# Patient Record
Sex: FEMALE | Race: WHITE | NOT HISPANIC OR LATINO | Employment: PART TIME | URBAN - METROPOLITAN AREA
[De-identification: names, ages, dates, MRNs, and addresses within clinical notes are randomized per-mention and may not be internally consistent; named-entity substitution may affect disease eponyms.]

---

## 2017-04-04 ENCOUNTER — TRANSCRIBE ORDERS (OUTPATIENT)
Dept: ADMINISTRATIVE | Facility: HOSPITAL | Age: 67
End: 2017-04-04

## 2017-04-04 DIAGNOSIS — Z12.31 VISIT FOR SCREENING MAMMOGRAM: Primary | ICD-10-CM

## 2017-04-07 ENCOUNTER — HOSPITAL ENCOUNTER (OUTPATIENT)
Dept: MAMMOGRAPHY | Facility: HOSPITAL | Age: 67
Discharge: HOME | End: 2017-04-07
Attending: FAMILY MEDICINE | Admitting: FAMILY MEDICINE

## 2017-04-07 DIAGNOSIS — Z12.31 VISIT FOR SCREENING MAMMOGRAM: ICD-10-CM

## 2017-04-07 PROCEDURE — 77063 BREAST TOMOSYNTHESIS BI: CPT | Performed by: RADIOLOGY

## 2017-04-07 PROCEDURE — 77063 BREAST TOMOSYNTHESIS BI: CPT

## 2017-04-07 PROCEDURE — G0202 SCR MAMMO BI INCL CAD: HCPCS

## 2017-04-07 PROCEDURE — G0202 SCR MAMMO BI INCL CAD: HCPCS | Performed by: RADIOLOGY

## 2017-04-10 ENCOUNTER — CONSULT (OUTPATIENT)
Dept: CARDIOLOGY | Facility: CLINIC | Age: 67
End: 2017-04-10

## 2017-04-10 VITALS
WEIGHT: 199.4 LBS | SYSTOLIC BLOOD PRESSURE: 176 MMHG | DIASTOLIC BLOOD PRESSURE: 77 MMHG | HEIGHT: 65 IN | HEART RATE: 72 BPM | BODY MASS INDEX: 33.22 KG/M2

## 2017-04-10 DIAGNOSIS — R07.2 PRECORDIAL PAIN: Primary | ICD-10-CM

## 2017-04-10 DIAGNOSIS — R06.09 DYSPNEA ON EXERTION: ICD-10-CM

## 2017-04-10 DIAGNOSIS — R42 LIGHTHEADEDNESS: ICD-10-CM

## 2017-04-10 DIAGNOSIS — I10 ESSENTIAL HYPERTENSION: ICD-10-CM

## 2017-04-10 PROBLEM — K21.9 GASTROESOPHAGEAL REFLUX DISEASE WITHOUT ESOPHAGITIS: Status: ACTIVE | Noted: 2017-04-10

## 2017-04-10 PROCEDURE — 93000 ELECTROCARDIOGRAM COMPLETE: CPT | Performed by: INTERNAL MEDICINE

## 2017-04-10 PROCEDURE — 99244 OFF/OP CNSLTJ NEW/EST MOD 40: CPT | Performed by: INTERNAL MEDICINE

## 2017-04-10 RX ORDER — NITROGLYCERIN 0.4 MG/1
TABLET SUBLINGUAL
Qty: 25 TABLET | Refills: 3 | Status: SHIPPED | OUTPATIENT
Start: 2017-04-10 | End: 2018-10-11 | Stop reason: SDUPTHER

## 2017-04-10 RX ORDER — BUPROPION HYDROCHLORIDE 300 MG/1
TABLET ORAL
Refills: 5 | COMMUNITY
Start: 2017-04-06

## 2017-04-10 RX ORDER — NITROGLYCERIN 0.4 MG/1
TABLET SUBLINGUAL
Qty: 100 TABLET | Refills: 11 | Status: SHIPPED | OUTPATIENT
Start: 2017-04-10

## 2017-04-10 RX ORDER — TORSEMIDE 10 MG/1
TABLET ORAL
Refills: 5 | COMMUNITY
Start: 2017-04-01

## 2017-04-10 RX ORDER — AMLODIPINE BESYLATE 5 MG/1
5 TABLET ORAL DAILY
Qty: 30 TABLET | Refills: 11 | Status: SHIPPED | OUTPATIENT
Start: 2017-04-10 | End: 2017-05-15 | Stop reason: SDUPTHER

## 2017-04-10 RX ORDER — ESOMEPRAZOLE MAGNESIUM 40 MG/1
CAPSULE, DELAYED RELEASE ORAL
Refills: 3 | COMMUNITY
Start: 2017-04-01

## 2017-04-10 RX ORDER — ASPIRIN 81 MG/1
81 TABLET ORAL DAILY
COMMUNITY

## 2017-04-10 RX ORDER — CARVEDILOL 25 MG/1
TABLET ORAL
Refills: 5 | COMMUNITY
Start: 2017-04-06

## 2017-04-10 RX ORDER — LOSARTAN POTASSIUM 100 MG/1
TABLET ORAL
Refills: 0 | COMMUNITY
Start: 2017-04-01

## 2017-04-10 NOTE — PROGRESS NOTES
[facility] AT [address], [address]  [place], [place], [id number] [phone]  WWW.[facility]           OUTPATIENT CLINIC CONSULTATION NOTE    Encounter Date:04/10/2017    Patient Care Team:  Patient Care Team:  Damon Hurtado MD as PCP - General (Family Medicine)    Subjective:   Reason for consultation:   Chief Complaint   Patient presents with   • Follow-up     CP/DIZZY/SOA       HPI:    Thais Weeks is a 66 y.o. female.  History of Present Illness  The patient has a history of an abnormal EKG with lateral ischemic changes, normal heart catheterization by Dr. Rasmussen in 2004, hypertension, GERD, who presents after an episode of significant dizziness and diaphoresis.  Also with a recent history of chest pain.    This morning the patient was at work and developed sudden onset dizziness and subsequent diaphoresis, lightheadedness.  Did not pass out.  This is a new problem.  It was severe.  Blood pressures in the 120s afterwards which is low for her.  Normally her blood pressures in the 140-160 systolic.  Additionally over the last couple months she has had dyspnea with exertion and chest pain.  The chest pain occurs at rest or with exertion.  Last minutes.  Is an uneasy feeling but not willi pain.    PFSH:  There is no problem list on file for this patient.        Current Outpatient Prescriptions:   •  nitroglycerin (NITROSTAT) 0.4 MG SL tablet, 1 under the tongue as needed for angina, may repeat q5mins for up three doses, Disp: 100 tablet, Rfl: 11    No Known Allergies    Social History     Social History   • Marital status: Single     Spouse name: N/A   • Number of children: N/A   • Years of education: N/A     Social History Main Topics   • Smoking status: Never Smoker   • Smokeless tobacco: None   • Alcohol use 0.6 - 1.2 oz/week     1 - 2 Glasses of wine per week      Comment: OCCAS   • Drug use: No   • Sexual activity: Defer     Other Topics Concern   • None  "    Social History Narrative     Family History   Problem Relation Age of Onset   • Breast cancer Neg Hx    • Ovarian cancer Neg Hx        Review of Systems:  Positive for dizziness, diaphoresis, dyspnea, chest pain  Negative for orthopnea, PND, lower extremity edema, palpitations, syncope.   Review of Systems  All other systems reviewed and are negative.    Objective:   Blood pressure 176/77, pulse 72, height 65\" (165.1 cm), weight 199 lb 6.4 oz (90.4 kg), not currently breastfeeding.  CONSTITUTIONAL: Well-nourished. In no acute distress.   SKIN: Warm and dry. No rashes noted  HEENT: Head is normocephalic and atraumatic.  Mucous membranes are pink and moist.   NECK: Supple without masses or thyromegaly. There is no jugular venous distention at 30°.  LUNGS: Normal effort. Clear to auscultation bilaterally without wheezing, rhonchi, or rales noted.   CARDIOVASCULAR: The heart has a regular rate with a normal S1 and S2. Extra heart beats. 2/6 MICHAEL at RUSB without radiation. There is no gallop, rub, or click appreciated.   PERIPHERAL VASCULAR: Carotid upstroke is 2+ bilaterally and without bruits. Radial pulses are 2+ bilaterally. Posterior tibial pulses are 2+ and symmetrical. There is no lower extremity edema.   ABDOMEN: Soft with no tenderness with palpitation. No hepatosplenomegaly  MUSCULOSKELETAL: Normal gait. No digital cyanosis  NEUROLOGICAL: Nonfocal.  PSYCHIATRIC: Alert, orientated x 3, appropriate affect     Labs:  No results found for: ALT, AST  No results found for: GLUF, CHOL, TRIG, HDL, LDLCALC, HDLLDLRATIO, LDLDIRECT, CREATININE, LABGLOM    Diagnostic Data:      ECG 12 Lead  Date/Time: 4/10/2017 3:31 PM  Performed by: RISHABH GRESHAM  Authorized by: RISHABH GRESHAM   Rhythm: sinus rhythm  Comments: Inferolateral ST depression.  By report this is not different than the ischemic changes seen on ECG in 2004 that prompted her heart catheterization            Assessment and Plan:   Thais was seen today for " follow-up.    Diagnoses and all orders for this visit:    Precordial pain  Dyspnea on exertion  Lightheadedness  Murmur  -The patient has a history of lateral ischemic changes on her ECG in 2004.  That prompted her left heart catheterization in the past which by report revealed normal coronary anatomy (Dr. Rasmussen)  -The patient has been having intermittent chest pain for the last 2 months  -Rule out ischemia with a stress test.  Evaluate for structural heart disease at the transthoracic echocardiogram  -Continue aspirin, carvedilol  -Added amlodipine for blood pressure as well as as an antianginal  -Nitroglycerin sublingual when necessary  -Depending on clinical course and stress test results, consider left heart catheterization  -Additionally can consider an event monitor if these lightheadednes episodes recur    Hypertension  -Continue home medications with the addition of amlodipine 5 mg daily     - Dietary and exercise counseling was provided during this visit  - Return in about 4 weeks (around 5/8/2017).    Jesus Hodges MD, MSc, [facility]  Interventional Cardiology  [facility]

## 2017-05-09 ENCOUNTER — HOSPITAL ENCOUNTER (OUTPATIENT)
Dept: CARDIOLOGY | Facility: HOSPITAL | Age: 67
Discharge: HOME | End: 2017-05-09
Attending: INTERNAL MEDICINE

## 2017-05-09 ENCOUNTER — HOSPITAL ENCOUNTER (OUTPATIENT)
Dept: CARDIOLOGY | Facility: HOSPITAL | Age: 67
Discharge: HOME | End: 2017-05-09
Attending: INTERNAL MEDICINE | Admitting: INTERNAL MEDICINE

## 2017-05-09 VITALS
BODY MASS INDEX: 33.15 KG/M2 | HEIGHT: 65 IN | SYSTOLIC BLOOD PRESSURE: 177 MMHG | DIASTOLIC BLOOD PRESSURE: 96 MMHG | WEIGHT: 199 LBS

## 2017-05-09 DIAGNOSIS — R07.2 PRECORDIAL PAIN: ICD-10-CM

## 2017-05-09 DIAGNOSIS — R06.09 DYSPNEA ON EXERTION: ICD-10-CM

## 2017-05-09 PROCEDURE — 78452 HT MUSCLE IMAGE SPECT MULT: CPT

## 2017-05-09 PROCEDURE — A9500 TC99M SESTAMIBI: HCPCS | Performed by: INTERNAL MEDICINE

## 2017-05-09 PROCEDURE — 93018 CV STRESS TEST I&R ONLY: CPT | Performed by: INTERNAL MEDICINE

## 2017-05-09 PROCEDURE — 93306 TTE W/DOPPLER COMPLETE: CPT | Performed by: INTERNAL MEDICINE

## 2017-05-09 PROCEDURE — C8929 TTE W OR WO FOL WCON,DOPPLER: HCPCS

## 2017-05-09 PROCEDURE — 0 TECHNETIUM SESTAMIBI: Performed by: INTERNAL MEDICINE

## 2017-05-09 PROCEDURE — 78452 HT MUSCLE IMAGE SPECT MULT: CPT | Performed by: INTERNAL MEDICINE

## 2017-05-09 PROCEDURE — 25010000002 SULFUR HEXAFLUORIDE MICROSPH 60.7-25 MG RECONSTITUTED SUSPENSION: Performed by: INTERNAL MEDICINE

## 2017-05-09 PROCEDURE — 93017 CV STRESS TEST TRACING ONLY: CPT

## 2017-05-09 RX ADMIN — SULFUR HEXAFLUORIDE 3 ML: KIT at 14:15

## 2017-05-09 RX ADMIN — Medication 1 DOSE: at 10:50

## 2017-05-09 RX ADMIN — Medication 1 DOSE: at 12:45

## 2017-05-10 LAB
BH CV ECHO MEAS - AO ROOT AREA (BSA CORRECTED): 1.3
BH CV ECHO MEAS - AO ROOT AREA: 4.8 CM^2
BH CV ECHO MEAS - AO ROOT DIAM: 2.5 CM
BH CV ECHO MEAS - BSA(HAYCOCK): 2.1 M^2
BH CV ECHO MEAS - BSA: 2 M^2
BH CV ECHO MEAS - BZI_BMI: 33.1 KILOGRAMS/M^2
BH CV ECHO MEAS - BZI_METRIC_HEIGHT: 165.1 CM
BH CV ECHO MEAS - BZI_METRIC_WEIGHT: 90.3 KG
BH CV ECHO MEAS - CONTRAST EF (2CH): 67.9 ML/M^2
BH CV ECHO MEAS - CONTRAST EF 4CH: 67.3 ML/M^2
BH CV ECHO MEAS - EDV(CUBED): 57.2 ML
BH CV ECHO MEAS - EDV(MOD-SP2): 84 ML
BH CV ECHO MEAS - EDV(MOD-SP4): 107 ML
BH CV ECHO MEAS - EDV(TEICH): 64.1 ML
BH CV ECHO MEAS - EF(CUBED): 75.4 %
BH CV ECHO MEAS - EF(MOD-SP2): 67.9 %
BH CV ECHO MEAS - EF(MOD-SP4): 67.3 %
BH CV ECHO MEAS - EF(TEICH): 68.1 %
BH CV ECHO MEAS - ESV(CUBED): 14.1 ML
BH CV ECHO MEAS - ESV(MOD-SP2): 27 ML
BH CV ECHO MEAS - ESV(MOD-SP4): 35 ML
BH CV ECHO MEAS - ESV(TEICH): 20.5 ML
BH CV ECHO MEAS - FS: 37.4 %
BH CV ECHO MEAS - IVS/LVPW: 0.98
BH CV ECHO MEAS - IVSD: 1.1 CM
BH CV ECHO MEAS - LA DIMENSION: 3.8 CM
BH CV ECHO MEAS - LA/AO: 1.5
BH CV ECHO MEAS - LAT PEAK E' VEL: 9.4 CM/SEC
BH CV ECHO MEAS - LV DIASTOLIC VOL/BSA (35-75): 54.2 ML/M^2
BH CV ECHO MEAS - LV MASS(C)D: 136.4 GRAMS
BH CV ECHO MEAS - LV MASS(C)DI: 69.1 GRAMS/M^2
BH CV ECHO MEAS - LV SYSTOLIC VOL/BSA (12-30): 17.7 ML/M^2
BH CV ECHO MEAS - LVIDD: 3.9 CM
BH CV ECHO MEAS - LVIDS: 2.4 CM
BH CV ECHO MEAS - LVLD AP2: 7 CM
BH CV ECHO MEAS - LVLD AP4: 7.5 CM
BH CV ECHO MEAS - LVLS AP2: 4.7 CM
BH CV ECHO MEAS - LVLS AP4: 5.5 CM
BH CV ECHO MEAS - LVPWD: 1.1 CM
BH CV ECHO MEAS - MED PEAK E' VEL: 6.7 CM/SEC
BH CV ECHO MEAS - PA ACC SLOPE: 679.3 CM/SEC^2
BH CV ECHO MEAS - PA ACC TIME: 0.11 SEC
BH CV ECHO MEAS - PA PR(ACCEL): 30.3 MMHG
BH CV ECHO MEAS - RAP SYSTOLE: 3 MMHG
BH CV ECHO MEAS - RVDD: 2.2 CM
BH CV ECHO MEAS - RVSP: 39.8 MMHG
BH CV ECHO MEAS - SI(CUBED): 21.9 ML/M^2
BH CV ECHO MEAS - SI(MOD-SP2): 28.9 ML/M^2
BH CV ECHO MEAS - SI(MOD-SP4): 36.5 ML/M^2
BH CV ECHO MEAS - SI(TEICH): 22.1 ML/M^2
BH CV ECHO MEAS - SV(CUBED): 43.2 ML
BH CV ECHO MEAS - SV(MOD-SP2): 57 ML
BH CV ECHO MEAS - SV(MOD-SP4): 72 ML
BH CV ECHO MEAS - SV(TEICH): 43.6 ML
BH CV ECHO MEAS - TAPSE (>1.6): 2.1 CM2
BH CV ECHO MEAS - TR MAX VEL: 303.1 CM/SEC
BH CV STRESS BP STAGE 2: NORMAL
BH CV STRESS BP STAGE 3: NORMAL
BH CV STRESS COMMENTS STAGE 1: NORMAL
BH CV STRESS DOSE REGADENOSON STAGE 1: 0.4
BH CV STRESS DURATION MIN STAGE 1: 1
BH CV STRESS DURATION MIN STAGE 2: 1
BH CV STRESS DURATION MIN STAGE 3: 1
BH CV STRESS DURATION MIN STAGE 4: 1
BH CV STRESS DURATION SEC STAGE 1: 0
BH CV STRESS DURATION SEC STAGE 2: 0
BH CV STRESS DURATION SEC STAGE 3: 0
BH CV STRESS DURATION SEC STAGE 4: 0
BH CV STRESS HR STAGE 1: 70
BH CV STRESS HR STAGE 2: 89
BH CV STRESS HR STAGE 3: 88
BH CV STRESS HR STAGE 4: 83
BH CV STRESS PROTOCOL 1: NORMAL
BH CV STRESS RECOVERY BP: NORMAL MMHG
BH CV STRESS RECOVERY HR: 88 BPM
BH CV STRESS STAGE 1: 1
BH CV STRESS STAGE 2: 2
BH CV STRESS STAGE 3: 3
BH CV STRESS STAGE 4: 4
BH CV XLRA - RV BASE: 3.9 CM
BH CV XLRA - RV LENGTH: 6.8 CM
BH CV XLRA - RV MID: 3.1 CM
BH CV XLRA - TDI S': 13.4 CM/SEC
LEFT ATRIUM VOLUME INDEX: 44.7 ML/M2
LEFT ATRIUM VOLUME: 88 CM3
LV EF 2D ECHO EST: 70 %
LV EF NUC BP: 79 %
MAXIMAL PREDICTED HEART RATE: 153 BPM
PERCENT MAX PREDICTED HR: 60.78 %
STRESS BASELINE BP: NORMAL MMHG
STRESS BASELINE HR: 60 BPM
STRESS PERCENT HR: 72 %
STRESS POST PEAK BP: NORMAL MMHG
STRESS POST PEAK HR: 93 BPM
STRESS TARGET HR: 130 BPM

## 2017-05-15 ENCOUNTER — OFFICE VISIT (OUTPATIENT)
Dept: CARDIOLOGY | Facility: CLINIC | Age: 67
End: 2017-05-15

## 2017-05-15 VITALS
BODY MASS INDEX: 34.35 KG/M2 | HEART RATE: 73 BPM | HEIGHT: 65 IN | WEIGHT: 206.2 LBS | SYSTOLIC BLOOD PRESSURE: 130 MMHG | DIASTOLIC BLOOD PRESSURE: 101 MMHG

## 2017-05-15 DIAGNOSIS — I10 ESSENTIAL HYPERTENSION: Primary | ICD-10-CM

## 2017-05-15 DIAGNOSIS — R06.09 DYSPNEA ON EXERTION: ICD-10-CM

## 2017-05-15 PROCEDURE — 99214 OFFICE O/P EST MOD 30 MIN: CPT | Performed by: INTERNAL MEDICINE

## 2017-05-15 RX ORDER — AMLODIPINE BESYLATE 10 MG/1
10 TABLET ORAL DAILY
Qty: 30 TABLET | Refills: 11 | Status: SHIPPED | OUTPATIENT
Start: 2017-05-15

## 2017-05-24 ENCOUNTER — TELEPHONE (OUTPATIENT)
Dept: CARDIOLOGY | Facility: CLINIC | Age: 67
End: 2017-05-24

## 2017-10-05 ENCOUNTER — OFFICE VISIT (OUTPATIENT)
Dept: ORTHOPEDIC SURGERY | Facility: CLINIC | Age: 67
End: 2017-10-05

## 2017-10-05 VITALS
BODY MASS INDEX: 35.08 KG/M2 | HEART RATE: 69 BPM | SYSTOLIC BLOOD PRESSURE: 147 MMHG | HEIGHT: 63 IN | WEIGHT: 198 LBS | DIASTOLIC BLOOD PRESSURE: 67 MMHG

## 2017-10-05 DIAGNOSIS — Z96.651 HISTORY OF TOTAL RIGHT KNEE REPLACEMENT: ICD-10-CM

## 2017-10-05 DIAGNOSIS — M17.12 PRIMARY OSTEOARTHRITIS OF LEFT KNEE: Primary | ICD-10-CM

## 2017-10-05 DIAGNOSIS — S80.00XA CONTUSION OF KNEE, UNSPECIFIED LATERALITY, INITIAL ENCOUNTER: ICD-10-CM

## 2017-10-05 PROCEDURE — 99214 OFFICE O/P EST MOD 30 MIN: CPT | Performed by: PHYSICIAN ASSISTANT

## 2017-10-05 RX ORDER — LORATADINE 10 MG
TABLET ORAL
Refills: 2 | COMMUNITY
Start: 2017-09-11

## 2017-10-05 RX ORDER — FERROUS GLUCONATE 324(37.5)
TABLET ORAL
Refills: 2 | COMMUNITY
Start: 2017-08-23

## 2017-10-05 NOTE — PROGRESS NOTES
Patient: Thais Weeks  : 1950    Primary Care Provider: Damon Hurtado MD    Requesting Provider: As above    Pain of the Left Knee and Pain of the Right Knee      History    Chief Complaint: Bilateral knee pain    History of Present Illness: Patient presents today with complaints of bilateral knee pain, left more painful than right.,  After a fall proximally 6 weeks ago.  She is status post right total knee arthroplasty with Dr. Bonds in  and has done well.  She reports that she is having anterior pain bilaterally with weightbearing and walking.  She has no night pain.  It bothers her more with stairs.  She reports no radiating pain or mechanical symptoms.  She reports that the pain is tolerable at this time 3/10.  She describes the pain as aching with intermittent sharp pain.  She is here today with concerns that she may have damaged her right knee prosthesis.  She is not had any previous treatment for the left knee.        No Known Allergies  Current Outpatient Prescriptions on File Prior to Visit   Medication Sig Dispense Refill   • Acetaminophen (TYLENOL ARTHRITIS PAIN PO) Take  by mouth 2 (Two) Times a Day.     • amLODIPine (NORVASC) 10 MG tablet Take 1 tablet by mouth Daily. 30 tablet 11   • aspirin 81 MG EC tablet Take 81 mg by mouth Daily.     • buPROPion XL (WELLBUTRIN XL) 300 MG 24 hr tablet TAKE 1 TABLET EVERY DAY  5   • carvedilol (COREG) 25 MG tablet TAKE 1 TABLET TWICE A DAY  5   • esomeprazole (nexIUM) 40 MG capsule 1 (ONE) CAPSULE BY MOUTH DAILY  3   • Glucosamine-Chondroit-Vit C-Mn (GLUCOSAMINE CHONDR 500 COMPLEX PO) Take  by mouth Take As Directed.     • losartan (COZAAR) 100 MG tablet TAKE 1 TABLET BY MOUTH DAILY  0   • nitroglycerin (NITROSTAT) 0.4 MG SL tablet 1 under the tongue as needed for angina, may repeat q5mins for up three doses 100 tablet 11   • nitroglycerin (NITROSTAT) 0.4 MG SL tablet 1 under the tongue as needed for angina, may repeat q5mins for up three doses  25 tablet 3   • Respiratory Therapy Supplies (INHALER COMPANIONS) misc      • torsemide (DEMADEX) 10 MG tablet TAKE 1 TABLET EVERY DAY  5   • dexlansoprazole (DEXILANT) 60 MG capsule Take  by mouth Take As Directed.       No current facility-administered medications on file prior to visit.      Social History     Social History   • Marital status: Single     Spouse name: N/A   • Number of children: N/A   • Years of education: N/A     Occupational History   • Not on file.     Social History Main Topics   • Smoking status: Never Smoker   • Smokeless tobacco: Never Used   • Alcohol use 0.6 - 1.2 oz/week     1 - 2 Glasses of wine per week      Comment: OCCAS   • Drug use: No   • Sexual activity: Defer     Other Topics Concern   • Not on file     Social History Narrative     Past Surgical History:   Procedure Laterality Date   • CATARACT EXTRACTION     • GALLBLADDER SURGERY     • ROTATOR CUFF REPAIR Right    • TOTAL KNEE ARTHROPLASTY Right      Family History   Problem Relation Age of Onset   • Heart disease Father    • Heart attack Maternal Grandfather    • Depression Mother    • Heart disease Mother    • Hypertension Mother    • Cancer Other    • Depression Other    • Stroke Other    • Diabetes Other    • Heart disease Other    • Hypertension Other    • Heart attack Other    • Diabetes Other    • Breast cancer Neg Hx    • Ovarian cancer Neg Hx      Past Medical History:   Diagnosis Date   • Arthritis of knee    • Bursitis     rotator cuff bursitis   • Edema    • Esophagitis    • Heart murmur    • Hypertension    • KAREN (obstructive sleep apnea)    • Osteoarthritis of ankle    • Tetanus          Review of Systems   Constitutional: Negative.    HENT: Negative.    Eyes: Negative.    Respiratory: Negative.    Cardiovascular: Negative.    Gastrointestinal: Negative.    Endocrine: Negative.    Genitourinary: Negative.    Musculoskeletal: Positive for arthralgias.   Skin: Negative.    Allergic/Immunologic: Negative.   "  Neurological: Negative.    Hematological: Negative.    Psychiatric/Behavioral: Negative.        The following portions of the patient's history were reviewed and updated as appropriate: allergies, current medications, past family history, past medical history, past social history, past surgical history and problem list.    Objective   /67  Pulse 69  Ht 63\" (160 cm)  Wt 198 lb (89.8 kg)  LMP  (LMP Unknown)  BMI 35.07 kg/m2    Physical Exam:   GENERAL: Body habitus: obese    Lower extremity edema: Left: 1+ pitting; Right: 1+ pitting    Varicose veins:  Left: moderate; Right: moderate    Gait: normal     Mental Status:  awake and alert; oriented to person, place, and time    Voice:  clear  SKIN:  Normal    Hair Growth:  Right:normal; Left:  normal  HEENT: Head: Normocephalic, no lesions, without obvious abnormality.     Eyes: sclera anicteric  PULM:  Repiratory effort normal    Ortho Exam  Right Hip Exam  ----------  FLEXION CONTRACTURE: None  FLEXION: 110 degrees  INTERNAL ROTATION: 20 degrees at 90 degrees of flexion   EXTERNAL ROTATION: 40 degrees at 90 degrees of flexion    PAIN WITH HIP MOTION: no      Right Knee Exam  ----------  ALIGNMENT: Right: neutral----------  RANGE OF MOTION:  Right: 0-120  LIGAMENTOUS STABILITY:   Right:stable to varus and valgus stress at terminal extension and 30 degrees without any evidence of laxity----------  STRENGTH:  KNEE FLEXION Right 5/5  KNEE EXTENSION Right 5/5 ----------  PAIN WITH PALPATION: Right pes bursa  PAIN WITH KNEE ROM: Right no  PATELLAR CREPITUS: Right yes   ----------  SENSATION TO LIGHT TOUCH:  DEEP PERONEAL/SUPERFICIAL PERONEAL/SURAL/SAPHENOUS/TIBIAL:   Right intact----------  EDEMA:  Right:  no;  ERYTHEMA:  Right: no;  WOUNDS/INCISIONS: none, no overlying skin problems.    Peripheral Vascular:    Upper Extremity:   Inspection:  Left--no cyanotic nail beds Right--no cyanotic nail beds   Bilateral:  Pink nail beds with brisk capillary " refill   Palpation:  Bilateral radial pulse normal    Musculoskeletal:  Global Assessment:  Overall assessment of Lower Extremity Muscle Strength and Tone:    Right quadriceps--5/5  Right hamstrings--5/5  Right tibialis anterior--5/5  Right gastroc soleus--5/5  Right EHL--5/5    Lower Extremity:  Knee/Patella:  No digital clubbing or cyanosis.    Examination of right knee reveals:  Normal deep tendon reflexes, coordination, strength, tone, sensation.  No known fractures or deformities.    Inspection and Palpation:      Left knee:  Tenderness:  Pes tenderness  Effusion:  none  Crepitus:  positive  Pulses:  2+  Ecchymosis:  None  Warmth:  None     ROM:    Left:  Extension:0     Flexion:120    Instability:      Left:    Negative, Varus stress test negative, Valgus stress test negative   Anterior Drawer Test:  Negative, Posterior Drawer Test:  Negative    Deformities/Malalignments/Discrepancies:    Left:  none      Medical Decision Making    Data Review:   ordered and reviewed x-rays today    Assessment and Plan/ Diagnosis/Treatment options:   Bilateral knee contusion with end-stage right knee arthritis and doing well status post right total knee arthroplasty 2014.  I reviewed today's x-rays and clinical findings with the patient.  X-rays today show well-positioned, cemented right total knee arthroplasty with no evidence of osteolysis, subsidence or fracture.  Left knee x-rays show end-stage left medial compartment arthritis with osteophytes.  On exam, she has tenderness over bilateral pes area with good range of motion and no evidence of ligament pathology or meniscal pathology on the left.  I think her pain is secondary to both the left knee arthritic flare second due to contusion and a right knee contusion.  She reports that she fell on the anterior knee where she is sore.  I explained to her that when one has a hard fall on the knees that it can take 6-8 weeks or longer for the pain to resolve.  She reports she  continues to have improving pain.  I did offer her injection of steroid on the in the left knee to help resolve her pain.  She reports she is not ready for that today.  I explained to her that if the knee does continue to bother her, she can just call and we will get her scheduled for an injection.  We'll see her back in 2 years with repeat x-rays of the right knee or sooner if needed.

## 2018-02-26 ENCOUNTER — OFFICE VISIT (OUTPATIENT)
Dept: CARDIOLOGY | Facility: CLINIC | Age: 68
End: 2018-02-26

## 2018-02-26 VITALS
DIASTOLIC BLOOD PRESSURE: 80 MMHG | BODY MASS INDEX: 33.32 KG/M2 | HEIGHT: 65 IN | HEART RATE: 74 BPM | SYSTOLIC BLOOD PRESSURE: 132 MMHG | WEIGHT: 200 LBS

## 2018-02-26 DIAGNOSIS — R06.09 DYSPNEA ON EXERTION: ICD-10-CM

## 2018-02-26 DIAGNOSIS — I10 ESSENTIAL HYPERTENSION: Primary | ICD-10-CM

## 2018-02-26 DIAGNOSIS — R01.1 HEART MURMUR: ICD-10-CM

## 2018-02-26 PROCEDURE — 99214 OFFICE O/P EST MOD 30 MIN: CPT | Performed by: INTERNAL MEDICINE

## 2018-02-26 NOTE — PROGRESS NOTES
[facility] AT [address], [address]  [place], [place], [address] [phone]  [facility]           OUTPATIENT CLINIC FOLLOW UP NOTE    Patient Care Team:  Patient Care Team:  Damon Hurtado MD as PCP - General (Family Medicine)    Subjective:   Reason for consultation:   Chief Complaint   Patient presents with   • Hypertension       HPI:    Thais Weeks is a 67 y.o. female.  Hypertension       The patient has a history of an abnormal EKG with lateral ischemic changes, normal heart catheterization by Dr. Rasmussen in 2004, hypertension, GERD, who presented to the cardiology clinic after an episode of significant dizziness and diaphoresis In 4/2017.    The patient's cardiac evaluation was unremarkable for specific cause of these symptoms.  She did undergo iron supplementation and B12 shots which seemed to have helped at that time.  More recently since she has been off of the B12 shots she feels that some of the symptoms recurred.  She was at one point on inhaler samples but since they did not help, these were not continued.    She denies chest pain, palpitations.    PFSH:  Patient Active Problem List   Diagnosis   • Essential hypertension   • Gastroesophageal reflux disease without esophagitis   • Heart murmur         Current Outpatient Prescriptions:   •  Acetaminophen (TYLENOL ARTHRITIS PAIN PO), Take  by mouth 2 (Two) Times a Day., Disp: , Rfl:   •  amLODIPine (NORVASC) 10 MG tablet, Take 1 tablet by mouth Daily., Disp: 30 tablet, Rfl: 11  •  aspirin 81 MG EC tablet, Take 81 mg by mouth Daily., Disp: , Rfl:   •  buPROPion XL (WELLBUTRIN XL) 300 MG 24 hr tablet, TAKE 1 TABLET EVERY DAY, Disp: , Rfl: 5  •  carvedilol (COREG) 25 MG tablet, TAKE 1 TABLET TWICE A DAY, Disp: , Rfl: 5  •  [employer] VITAMIN C 500 MG tablet, TAKE 1 (ONE) TABLET THREE TIMES DAILY, Disp: , Rfl: 2  •  esomeprazole (nexIUM) 40 MG capsule, 1 (ONE) CAPSULE BY MOUTH DAILY, Disp: , Rfl: 3  •   "ferrous gluconate 324 (37.5 Fe) MG tablet tablet, TAKE 1 TABLET THREE TIMES A DAY, Disp: , Rfl: 2  •  losartan (COZAAR) 100 MG tablet, TAKE 1 TABLET BY MOUTH DAILY, Disp: , Rfl: 0  •  nitroglycerin (NITROSTAT) 0.4 MG SL tablet, 1 under the tongue as needed for angina, may repeat q5mins for up three doses, Disp: 100 tablet, Rfl: 11  •  nitroglycerin (NITROSTAT) 0.4 MG SL tablet, 1 under the tongue as needed for angina, may repeat q5mins for up three doses, Disp: 25 tablet, Rfl: 3  •  Respiratory Therapy Supplies (INHALER COMPANIONS) misc, As Needed., Disp: , Rfl:   •  torsemide (DEMADEX) 10 MG tablet, TAKE 1 TABLET EVERY DAY, Disp: , Rfl: 5    No Known Allergies    Social History     Social History   • Marital status: Single     Spouse name: N/A   • Number of children: N/A   • Years of education: N/A     Social History Main Topics   • Smoking status: Never Smoker   • Smokeless tobacco: Never Used   • Alcohol use 0.6 - 1.2 oz/week     1 - 2 Glasses of wine per week      Comment: OCCAS   • Drug use: No   • Sexual activity: Defer     Other Topics Concern   • None     Social History Narrative     Family History   Problem Relation Age of Onset   • Heart disease Father    • Heart attack Maternal Grandfather    • Depression Mother    • Heart disease Mother    • Hypertension Mother    • Cancer Other    • Depression Other    • Stroke Other    • Diabetes Other    • Heart disease Other    • Hypertension Other    • Heart attack Other    • Diabetes Other    • Breast cancer Neg Hx    • Ovarian cancer Neg Hx        Review of Systems:  Positive for dizziness, diaphoresis, dyspnea   Negative for chest pain orthopnea, PND, lower extremity edema, palpitations, syncope.   Review of Systems  All other systems reviewed and are negative.    Objective:   Blood pressure 132/80, pulse 74, height 165.1 cm (65\"), weight 90.7 kg (200 lb), not currently breastfeeding.  CONSTITUTIONAL: Well-nourished. In no acute distress.   LUNGS: Normal effort. " Clear to auscultation bilaterally without wheezing, rhonchi, or rales noted.   CARDIOVASCULAR: The heart has a regular rate with a normal S1 and S2.  2/6 MICHAEL at RUSB without radiation. There is no gallop, rub, or click appreciated.   PERIPHERAL VASCULAR: There is no lower extremity edema.       Labs:  No results found for: ALT, AST  No results found for: GLUF, CHOL, TRIG, HDL, HDLLDLRATIO, LDLDIRECT, CREATININE, LABGLOM    Diagnostic Data:    Procedures    Stress 5/2017  · Left ventricular ejection fraction is hyperdynamic (Calculated EF > 70%).  · Myocardial perfusion imaging indicates a small-sized, mildly severe area of ischemia located in the basal inferior lateral wall.  · Impressions are consistent with a low risk study.    TTE 5/2017  · Left ventricular function is normal. Estimated EF = 70%.  · Left ventricular wall thickness is consistent with mild concentric hypertrophy.  · Left ventricular diastolic dysfunction (grade I) consistent with impaired relaxation.  · Left atrial cavity size is moderately dilated.  · Estimated right ventricular systolic pressure from tricuspid regurgitation is mildly elevated (35-45 mmHg).    Assessment and Plan:   Thais was seen today for follow-up.    Diagnoses and all orders for this visit:    Precordial pain  Dyspnea on exertion  Lightheadedness  Murmur  -The patient has a history of lateral ischemic changes on her ECG in 2004.  That prompted her left heart catheterization in the past which by report revealed normal coronary anatomy (Dr. Rasmussen)  -Asymptomatic from a cardiac viewpoint at this time.  Continue current medical regimen.  -Nitroglycerin sublingual when necessary  -If the patient's symptoms continue and worsen, consider left heart catheterization for atypical angina and a mildly abnormal stress test.  This future possibility was discussed with the patient today  -Can consider an event monitor if these lightheadednes episodes recur    Hypertension  -Continue  current medications,    - Return if symptoms worsen or fail to improve.    Jesus Hodges MD, MSc, [facility]  Interventional Cardiology  [facility]

## 2018-08-27 ENCOUNTER — TELEPHONE (OUTPATIENT)
Dept: ORTHOPEDIC SURGERY | Facility: CLINIC | Age: 68
End: 2018-08-27

## 2018-08-27 NOTE — TELEPHONE ENCOUNTER
Called the pharmacy, they sent the PA to our old fax number so we never received it. I gave them our new fax and they are faxing me a PA for her today which I will work on tomorrow.  Tati

## 2018-10-12 RX ORDER — NITROGLYCERIN 0.4 MG/1
TABLET SUBLINGUAL
Qty: 25 TABLET | Refills: 0 | Status: SHIPPED | OUTPATIENT
Start: 2018-10-12

## 2018-10-15 RX ORDER — NITROGLYCERIN 0.4 MG/1
TABLET SUBLINGUAL
Qty: 25 TABLET | Refills: 2 | Status: SHIPPED | OUTPATIENT
Start: 2018-10-15

## 2018-10-31 ENCOUNTER — TRANSCRIBE ORDERS (OUTPATIENT)
Dept: ADMINISTRATIVE | Facility: HOSPITAL | Age: 68
End: 2018-10-31

## 2018-10-31 DIAGNOSIS — Z12.31 VISIT FOR SCREENING MAMMOGRAM: Primary | ICD-10-CM

## 2018-12-11 ENCOUNTER — HOSPITAL ENCOUNTER (OUTPATIENT)
Dept: MAMMOGRAPHY | Facility: HOSPITAL | Age: 68
Discharge: HOME | End: 2018-12-11
Attending: FAMILY MEDICINE | Admitting: FAMILY MEDICINE

## 2018-12-11 DIAGNOSIS — Z12.31 VISIT FOR SCREENING MAMMOGRAM: ICD-10-CM

## 2018-12-11 PROCEDURE — 77067 SCR MAMMO BI INCL CAD: CPT

## 2018-12-11 PROCEDURE — 77063 BREAST TOMOSYNTHESIS BI: CPT

## 2018-12-11 PROCEDURE — 77063 BREAST TOMOSYNTHESIS BI: CPT | Performed by: RADIOLOGY

## 2018-12-11 PROCEDURE — 77067 SCR MAMMO BI INCL CAD: CPT | Performed by: RADIOLOGY

## 2020-12-17 ENCOUNTER — TRANSCRIBE ORDERS (OUTPATIENT)
Dept: ADMINISTRATIVE | Facility: HOSPITAL | Age: 70
End: 2020-12-17

## 2020-12-17 DIAGNOSIS — Z12.31 VISIT FOR SCREENING MAMMOGRAM: Primary | ICD-10-CM

## 2021-03-30 ENCOUNTER — HOSPITAL ENCOUNTER (OUTPATIENT)
Dept: MAMMOGRAPHY | Facility: HOSPITAL | Age: 71
Discharge: HOME | End: 2021-03-30
Admitting: FAMILY MEDICINE

## 2021-03-30 DIAGNOSIS — Z12.31 VISIT FOR SCREENING MAMMOGRAM: ICD-10-CM

## 2021-03-30 PROCEDURE — 77067 SCR MAMMO BI INCL CAD: CPT | Performed by: RADIOLOGY

## 2021-03-30 PROCEDURE — 77063 BREAST TOMOSYNTHESIS BI: CPT

## 2021-03-30 PROCEDURE — 77067 SCR MAMMO BI INCL CAD: CPT

## 2021-03-30 PROCEDURE — 77063 BREAST TOMOSYNTHESIS BI: CPT | Performed by: RADIOLOGY

## 2021-12-22 ENCOUNTER — OFFICE VISIT (OUTPATIENT)
Dept: ORTHOPEDIC SURGERY | Facility: CLINIC | Age: 71
End: 2021-12-22

## 2021-12-22 VITALS — BODY MASS INDEX: 33.32 KG/M2 | HEIGHT: 65 IN | WEIGHT: 200 LBS

## 2021-12-22 DIAGNOSIS — M70.51 PES ANSERINUS BURSITIS OF RIGHT KNEE: ICD-10-CM

## 2021-12-22 DIAGNOSIS — M19.072 ARTHRITIS OF LEFT FOOT: ICD-10-CM

## 2021-12-22 DIAGNOSIS — M17.12 PRIMARY OSTEOARTHRITIS OF LEFT KNEE: ICD-10-CM

## 2021-12-22 DIAGNOSIS — M79.672 LEFT FOOT PAIN: ICD-10-CM

## 2021-12-22 DIAGNOSIS — Z96.651 S/P TKR (TOTAL KNEE REPLACEMENT), RIGHT: Primary | ICD-10-CM

## 2021-12-22 PROCEDURE — 99204 OFFICE O/P NEW MOD 45 MIN: CPT | Performed by: PHYSICIAN ASSISTANT

## 2021-12-22 PROCEDURE — 20610 DRAIN/INJ JOINT/BURSA W/O US: CPT | Performed by: PHYSICIAN ASSISTANT

## 2021-12-22 RX ADMIN — LIDOCAINE HYDROCHLORIDE 1 ML: 10 INJECTION, SOLUTION INFILTRATION; PERINEURAL at 16:02

## 2021-12-22 RX ADMIN — TRIAMCINOLONE ACETONIDE 40 MG: 40 INJECTION, SUSPENSION INTRA-ARTICULAR; INTRAMUSCULAR at 16:02

## 2021-12-22 NOTE — PROGRESS NOTES
Procedure   Large Joint Arthrocentesis - right pes bursa: R knee  Date/Time: 12/22/2021 4:02 PM  Consent given by: patient  Site marked: site marked  Timeout: Immediately prior to procedure a time out was called to verify the correct patient, procedure, equipment, support staff and site/side marked as required   Supporting Documentation  Indications: pain   Procedure Details  Location: knee - R knee (right pes bursa)  Preparation: Patient was prepped and draped in the usual sterile fashion  Needle size: 22 G  Approach: anteromedial  Medications administered: 1 mL lidocaine 1 %; 40 mg triamcinolone acetonide 40 MG/ML  Patient tolerance: patient tolerated the procedure well with no immediate complications

## 2021-12-22 NOTE — PROGRESS NOTES
[facility] Note        Subjective     Pain of the Left Foot and Follow-up (4 years- primary osteoarthritis of left knee, s/p (R) TKA 2014- Dr Nick Bonds)      GLADYS Weeks is a 71 y.o. female.  Patient returns today for follow-up of her right knee, left knee pain as well as left foot pain.  She complains of bilateral knee pain as well as left dorsal foot pain.  She is status post right total knee with Dr. Elizondo in 2014.  She complains of proximal tibia pain.  No joint line pain.  No swelling warmth or erythema.  No constitutional symptoms, fever chills.  Her left knee has been bothering for years.  She has had prior injection which she does not think helps.  She is not yet interested in any surgery.  She has persisting dorsal left foot pain.  It is gotten progressively worse.  She has tried custom orthotics in the past.  Here to discuss further treatment options.    Past Medical History:   Diagnosis Date   • Arthritis of knee    • Bursitis     rotator cuff bursitis   • Edema    • Esophagitis    • Heart murmur    • Hypertension    • KAREN (obstructive sleep apnea)    • Osteoarthritis of ankle    • Tetanus       Past Surgical History:   Procedure Laterality Date   • CATARACT EXTRACTION     • GALLBLADDER SURGERY     • ROTATOR CUFF REPAIR Right    • TOTAL KNEE ARTHROPLASTY Right       Family History   Problem Relation Age of Onset   • Heart disease Father    • Heart attack Maternal Grandfather    • Depression Mother    • Heart disease Mother    • Hypertension Mother    • Cancer Other    • Depression Other    • Stroke Other    • Diabetes Other    • Heart disease Other    • Hypertension Other    • Heart attack Other    • Diabetes Other    • Breast cancer Neg Hx    • Ovarian cancer Neg Hx      Social History     Socioeconomic History   • Marital status: Single   Tobacco Use   • Smoking status: Never Smoker   • Smokeless tobacco: Never Used   Substance and Sexual Activity   •  Alcohol use: Yes     Alcohol/week: 1.0 - 2.0 standard drink     Types: 1 - 2 Glasses of wine per week     Comment: OCCAS   • Drug use: No   • Sexual activity: Defer      Current Outpatient Medications on File Prior to Visit   Medication Sig Dispense Refill   • Acetaminophen (TYLENOL ARTHRITIS PAIN PO) Take  by mouth 2 (Two) Times a Day.     • amLODIPine (NORVASC) 10 MG tablet Take 1 tablet by mouth Daily. 30 tablet 11   • aspirin 81 MG EC tablet Take 81 mg by mouth Daily.     • buPROPion XL (WELLBUTRIN XL) 300 MG 24 hr tablet TAKE 1 TABLET EVERY DAY  5   • carvedilol (COREG) 25 MG tablet TAKE 1 TABLET TWICE A DAY  5   • [employer] VITAMIN C 500 MG tablet TAKE 1 (ONE) TABLET THREE TIMES DAILY  2   • diclofenac (VOLTAREN) 1 % gel gel Apply 4 g topically to the appropriate area as directed 4 (Four) Times a Day As Needed (as needed). 1 tube 5   • esomeprazole (nexIUM) 40 MG capsule 1 (ONE) CAPSULE BY MOUTH DAILY  3   • ferrous gluconate 324 (37.5 Fe) MG tablet tablet TAKE 1 TABLET THREE TIMES A DAY  2   • losartan (COZAAR) 100 MG tablet TAKE 1 TABLET BY MOUTH DAILY  0   • nitroglycerin (NITROSTAT) 0.4 MG SL tablet 1 under the tongue as needed for angina, may repeat q5mins for up three doses 100 tablet 11   • nitroglycerin (NITROSTAT) 0.4 MG SL tablet 1 UNDER THE TONGUE AS NEEDED FOR ANGINA, MAY REPEAT Q5MINS FOR UP THREE DOSES 25 tablet 0   • nitroglycerin (NITROSTAT) 0.4 MG SL tablet 1 UNDER THE TONGUE AS NEEDED FOR ANGINA, MAY REPEAT Q5MINS FOR UP THREE DOSES 25 tablet 2   • Respiratory Therapy Supplies (INHALER COMPANIONS) misc As Needed.     • torsemide (DEMADEX) 10 MG tablet TAKE 1 TABLET EVERY DAY  5     No current facility-administered medications on file prior to visit.      No Known Allergies       Review of Systems     I reviewed the patient's chief complaint, history of present illness, review of systems, past medical history, surgical history, family history, social history, medications and allergy list.       "  Objective      Physical Exam  Ht 165.1 cm (65\")   Wt 90.7 kg (200 lb)   LMP  (LMP Unknown)   BMI 33.28 kg/m²     Body mass index is 33.28 kg/m².    General  Mental Status - alert  General Appearance - cooperative, well groomed, not in acute distress  Orientation - Oriented X3  Build & Nutrition - well developed and well nourished  Posture - normal posture  Gait - antalgic       Ortho Exam    Right Knee Exam  ----------  ALIGNMENT: Right: neutral----------  RANGE OF MOTION:  Right: 0-120  LIGAMENTOUS STABILITY:   Right: stable to valgus and varus stress----------  STRENGTH:  KNEE FLEXION Right 5/5  KNEE EXTENSION Right 5/5 ----------  PAIN WITH PALPATION: Right pes bursa  PAIN WITH KNEE ROM: Right no  PATELLAR CREPITUS: Right yes   ----------    Left Knee Exam  ----------  ALIGNMENT: Left: neutral----------  RANGE OF MOTION:  Left: 0-120  LIGAMENTOUS STABILITY:   Left: stable to valgus and varus stress----------  STRENGTH:  KNEE FLEXION left 5/5  KNEE EXTENSION left 5/5 ----------  PAIN WITH PALPATION: Left medial joint line and lateral joint line  PAIN WITH KNEE ROM: Left no  PATELLAR CREPITUS: Left yes     Left foot exam: Tender palpation over the midfoot specifically over the talonavicular joint dorsally.  No swelling warmth or erythema.  Neurovascular intact distally.  Pulses 2+    Imaging/Studies  Imaging Results (Last 24 Hours)     Procedure Component Value Units Date/Time    XR Foot 3+ View Left [[id number]] Resulted: 12/22/21 1545     Updated: 12/22/21 1546    Narrative:      Left Foot Radiographs  Indication: left foot pain  Views: Weight-bearing AP and lateral, with oblique views of the left foot    Comparison: no prior studies available for review    Findings:  Talonavicular arthritis, midfoot arthritis, no acute bony abnormalities.    Diffuse osteopenia.    XR Knee 4+ View Left [[id number]] Resulted: 12/22/21 1544     Updated: 12/22/21 1545    Narrative:      Left Knee Radiographs  Indication: left knee " pain  Views: Standing AP's and skiers of both knees, with lateral and sunrise   views of the left knee    Comparison: 10/5/2017    Findings:   Bone-on-bone contact medial compartment, tricompartmental osteophytes,   bone-on-bone contact laterally, advanced patellofemoral arthritis, varus   alignment, worsening compared to the previous imaging.    XR Knee 3+ View With Ogilvie Right [[id number]] Resulted: 12/22/21 1544     Updated: 12/22/21 1544    Narrative:      Right Knee Radiographs  Indication: status-post right total knee arthroplasty  Views: AP, lateral, and sunrise views of the right knee    Comparison: no change compared to prior study, 10/5/2017    Findings:   The components are well aligned, with no signs of loosening or failure.            Assessment    Assessment:  1. S/P TKR (total knee replacement), right    2. Primary osteoarthritis of left knee    3. Left foot pain    4. Arthritis of left foot    5. Pes anserinus bursitis of right knee          Plan:  1. Recommend over-the-counter medication as needed for discomfort  2. Right pes bursitis status post right total knee arthroplasty.  X-rays today show no evidence of osteolysis, subsidence or fracture.  I think her pain and functional imitations are likely secondary to pes bursitis.  We discussed further treatment including cortisone injection is diagnostic and therapeutic.  She may also benefit from physical therapy.  I reassured her that her prosthesis is well-positioned.  Plan today is cortisone injection into the right pes bursa.  I will see her back as needed.  3. Left knee arthritis.  I reviewed today's x-rays clinical findings past and current treatment the patient.  She reports cortisone injection did not help her in the past and she is not interested in repeat injection.  She is not interested in surgery either.  She will continue with topical anti-inflammatories and return to see us as needed.  4. Right foot arthritis.  I reviewed today's x-rays  with the patient.  We discussed further treatment including new custom orthotics, topical anti-inflammatories, injection and fusion.  She is not interested in any further treatment.  RTC prn.    Using sterile technique, the left knee was sterilely prepped with Hibiclens.  Following timeout, using 22-gauge needle the left pes bursa was injected with 40 mg of kenalog and 1 cc of lidocaine.  Patient tolerated procedure well.  No complications.    History, diagnosis and treatment plan discussed with Dr. Marshall.        Deb Evans PA-C  12/23/21  13:15 EST

## 2021-12-23 RX ORDER — TRIAMCINOLONE ACETONIDE 40 MG/ML
40 INJECTION, SUSPENSION INTRA-ARTICULAR; INTRAMUSCULAR
Status: COMPLETED | OUTPATIENT
Start: 2021-12-22 | End: 2021-12-22

## 2021-12-23 RX ORDER — LIDOCAINE HYDROCHLORIDE 10 MG/ML
1 INJECTION, SOLUTION INFILTRATION; PERINEURAL
Status: COMPLETED | OUTPATIENT
Start: 2021-12-22 | End: 2021-12-22

## 2023-11-02 ENCOUNTER — TRANSCRIBE ORDERS (OUTPATIENT)
Dept: ADMINISTRATIVE | Facility: HOSPITAL | Age: 73
End: 2023-11-02
Payer: MEDICARE

## 2023-11-02 DIAGNOSIS — Z12.31 VISIT FOR SCREENING MAMMOGRAM: Primary | ICD-10-CM

## 2024-03-25 ENCOUNTER — HOSPITAL ENCOUNTER (OUTPATIENT)
Dept: MAMMOGRAPHY | Facility: HOSPITAL | Age: 74
Discharge: HOME | End: 2024-03-25
Admitting: FAMILY MEDICINE
Payer: MEDICARE

## 2024-03-25 DIAGNOSIS — Z12.31 VISIT FOR SCREENING MAMMOGRAM: ICD-10-CM

## 2024-03-25 PROCEDURE — 77063 BREAST TOMOSYNTHESIS BI: CPT

## 2024-03-25 PROCEDURE — 77067 SCR MAMMO BI INCL CAD: CPT

## 2024-07-18 ENCOUNTER — OFFICE VISIT (OUTPATIENT)
Dept: ORTHOPEDIC SURGERY | Facility: CLINIC | Age: 74
End: 2024-07-18
Payer: MEDICARE

## 2024-07-18 VITALS
WEIGHT: 177.2 LBS | HEIGHT: 65 IN | BODY MASS INDEX: 29.52 KG/M2 | DIASTOLIC BLOOD PRESSURE: 88 MMHG | SYSTOLIC BLOOD PRESSURE: 134 MMHG

## 2024-07-18 DIAGNOSIS — M65.332 TRIGGER MIDDLE FINGER OF LEFT HAND: Primary | ICD-10-CM

## 2024-07-18 RX ORDER — TRIAMCINOLONE ACETONIDE 40 MG/ML
20 INJECTION, SUSPENSION INTRA-ARTICULAR; INTRAMUSCULAR
Status: COMPLETED | OUTPATIENT
Start: 2024-07-18 | End: 2024-07-18

## 2024-07-18 RX ORDER — LIDOCAINE HYDROCHLORIDE 10 MG/ML
0.5 INJECTION, SOLUTION EPIDURAL; INFILTRATION; INTRACAUDAL; PERINEURAL
Status: COMPLETED | OUTPATIENT
Start: 2024-07-18 | End: 2024-07-18

## 2024-07-18 RX ADMIN — TRIAMCINOLONE ACETONIDE 20 MG: 40 INJECTION, SUSPENSION INTRA-ARTICULAR; INTRAMUSCULAR at 14:41

## 2024-07-18 RX ADMIN — LIDOCAINE HYDROCHLORIDE 0.5 ML: 10 INJECTION, SOLUTION EPIDURAL; INFILTRATION; INTRACAUDAL; PERINEURAL at 14:41

## 2024-07-18 NOTE — PROGRESS NOTES
Procedure   - Hand/Upper Extremity Injection: L long A1 for trigger finger on 7/18/2024 2:41 PM  Indications: pain  Details: 27 G needle, volar approach  Medications: 0.5 mL lidocaine PF 1% 1 %; 20 mg triamcinolone acetonide 40 MG/ML  Outcome: tolerated well, no immediate complications  Procedure, treatment alternatives, risks and benefits explained, specific risks discussed. Consent was given by the patient. Immediately prior to procedure a time out was called to verify the correct patient, procedure, equipment, support staff and site/side marked as required. Patient was prepped and draped in the usual sterile fashion.

## 2024-07-18 NOTE — PROGRESS NOTES
[facility]     Office Visit       Date: 07/18/2024   Patient Name: Thais Weeks  MRN: [id number]  YOB: 1950    Referring Physician: No ref. provider found     Chief Complaint:   Chief Complaint   Patient presents with    Left Hand - Pain, Initial Evaluation     Thumb      History of Present Illness:   Thais Weeks is a 74 y.o. female right-hand-dominant clinically with complaints of left long finger pain.  She believes this is been present for approximately 5 to 6 weeks.  There was no specific trauma, however she does state that she had a fall sometime recently.  She endorses popping and catching of the long finger.  This is mildly painful and it occurs.  No pain at rest.  She been taking anti-inflammatories.  No recent injections.  No other complaints or concerns.    Patient has had a history of increased blood glucose levels, however has not been diagnosed with diabetes.    Subjective   Review of Systems:   Review of Systems   Constitutional: Negative.  Negative for chills, fatigue and fever.   HENT: Negative.  Negative for congestion and dental problem.    Eyes: Negative.  Negative for blurred vision.   Respiratory: Negative.  Negative for shortness of breath.    Cardiovascular: Negative.  Negative for leg swelling.   Gastrointestinal: Negative.  Negative for abdominal pain.   Endocrine: Negative.  Negative for polyuria.   Genitourinary: Negative.  Negative for difficulty urinating.   Musculoskeletal:  Positive for arthralgias.   Skin: Negative.    Allergic/Immunologic: Negative.    Neurological: Negative.    Hematological: Negative.  Negative for adenopathy.   Psychiatric/Behavioral: Negative.  Negative for behavioral problems.       Pertinent review of systems per HPI    I reviewed the patient's chief complaint, history of present illness, review of systems, past medical history, surgical history, family  "history, social history, medications and allergy list in the EMR on 07/18/2024 and agree with the findings above.    Objective    Vital Signs:   Vitals:    07/18/24 1412   BP: 134/88   Weight: 80.4 kg (177 lb 3.2 oz)   Height: 165.1 cm (65\")     BMI:      General: No acute distress. Alert and oriented.   Cardiovascular: Palpable radial pulse.   Respiratory: Breathing is nonlabored.     Ortho Exam:  Examination of the left upper extremity demonstrates no deformity.  No skin lesions or abrasions.  No swelling or ecchymosis.  She is minimally tender to the long finger A1 pulley.  There is catching and locking of the long finger also during examination.  She is able to make a full composite fist.  Sensation intact light touch throughout the hand.  Warm and well-perfused distally.    Imaging / Studies:    Imaging Results (Last 24 Hours)       ** No results found for the last 24 hours. **          Procedure Note:  After reviewing the risks, benefits and alternatives to a steroid injection, which include but are not limited to; hypopigmentation, fat necrosis/atrophy, pain, swelling, bleeding, bruising, damage to nearby nerves/vessels, allergic reaction , transient elevation in blood glucose levels and infection a verbal consent was obtained. A time-out was then performed and the affected hand was prepped with chlorhexadine soap and ethyl chloride was used to numb the skin. The left long finger flexor tendon sheath was injected with 0.5cc: 0.5cc mixture of Kenalog - 40 mg/ml and Lidocaine - 1% / 2 ml. The injection was well tolerated and a sterile dressing was applied. There were no complications. I advised the patient that they might experience some local discomfort for the next couple days and can apply ice to the site as needed.    Assessment / Plan    Assessment/Plan:   Thais Weeks is a 74 y.o. female with left long trigger finger.    I discussed with the patient their clinical findings demonstrate a trigger " finger.  We had a lengthy discussion regarding the pathophysiology of inflammation of the tendon-sheath unit, using the analogy of a subway tunnel.  Both conservative and surgical options were discussed.  Conservative treatments in the form of: observation, gentle stretching exercises, nighttime coban wrapping, and injection were presented. Discussed that approximately 70% of patients have complete symptoms resolution after 1 steroid injection, and should they fail 1 injection, they may still be considered for a second steroid injection.  Also discussed that the patient may not see any improvement from the steroid injection for sometimes 2 weeks. Operative treatments in the form of A1 pulley release was also discussed.  After expressing understanding of all options, the patient elects to proceed with steroid injection today, nighttime Coban wrapping, and gentle stretching.  I will see her back in 3 months for reevaluation.  They were agreeable with the plan.  All questions and concerns were addressed.       ICD-10-CM ICD-9-CM   1. Trigger middle finger of left hand  M65.332 727.03     Follow Up:   Return in about 3 months (around 10/18/2024) for Follow Up.      Sheridan Evangelista MD  [facility]

## 2024-10-24 ENCOUNTER — OFFICE VISIT (OUTPATIENT)
Dept: ORTHOPEDIC SURGERY | Facility: CLINIC | Age: 74
End: 2024-10-24
Payer: MEDICARE

## 2024-10-24 VITALS
BODY MASS INDEX: 29.79 KG/M2 | WEIGHT: 178.8 LBS | SYSTOLIC BLOOD PRESSURE: 104 MMHG | HEIGHT: 65 IN | DIASTOLIC BLOOD PRESSURE: 60 MMHG

## 2024-10-24 DIAGNOSIS — M65.332 TRIGGER MIDDLE FINGER OF LEFT HAND: Primary | ICD-10-CM

## 2024-10-24 NOTE — PROGRESS NOTES
"                                                                 [facility]     Office Visit       Date: 10/24/2024   Patient Name: Thais Weeks  MRN: [id number]  YOB: 1950    Referring Physician: No ref. provider found     Chief Complaint:   Chief Complaint   Patient presents with    Follow-up     3 month follow up -- Left Hand - Pain     History of Present Illness:   Thais Weeks is a 74 y.o. female RHD presenting to clinic for follow up for left long trigger. At her last clinic visit she received a CSI. She reports that the injection improved her symptoms for approximately 2 and half months.  She has had slight increased pain within the past couple of weeks, however it does feel improved from her last visit.  She has some occasional catching but this is not painful and does not require manual straightening.  No other affected digits.  No other complaints or concerns.    Patient has had a history of increased blood glucose levels, however has not been diagnosed with diabetes.     Subjective   Review of Systems:   Review of Systems   Pertinent review of systems per HPI    I reviewed the patient's chief complaint, history of present illness, review of systems, past medical history, surgical history, family history, social history, medications and allergy list in the EMR on 10/24/2024 and agree with the findings above.    Objective    Vital Signs:   Vitals:    10/24/24 1035   BP: 104/60   Weight: 81.1 kg (178 lb 12.8 oz)   Height: 165.1 cm (65\")     BMI:      General: No acute distress. Alert and oriented.   Cardiovascular: Palpable radial pulse.   Respiratory: Breathing is nonlabored.     Ortho Exam:  Examination of the left upper extremity demonstrates no deformity.  No skin lesions or abrasions.  No swelling or ecchymosis.  She is mildly tender at the long finger A1 pulley.  There is catching noted without willi locking.  The remainder of the hand wrist and digits are " nontender.  Negative Tinel, Durkan's, Phalen's at the wrist.  Sensation is intact to light touch throughout the hand.  Warm well-perfused distally.    Imaging / Studies:    Imaging Results (Last 24 Hours)       ** No results found for the last 24 hours. **          Assessment / Plan    Assessment/Plan:   Thais Weeks is a 74 y.o. female with left long trigger finger.     Patient has had improvements in her symptoms after a corticosteroid injection.  She overall feels improved and would like to hold off on any additional treatments including repeat injection or discussions of surgery.  He may continue to use the extremity without restriction.  Encouraged gentle stretching.  She will follow-up with me as needed.  All questions and concerns were addressed.  She was agreeable.      ICD-10-CM ICD-9-CM   1. Trigger middle finger of left hand  M65.332 727.03     Follow Up:   Return if symptoms worsen or fail to improve.      Sheridan Evangelista MD  [facility]

## 2025-01-27 ENCOUNTER — OFFICE VISIT (OUTPATIENT)
Dept: ORTHOPEDIC SURGERY | Facility: CLINIC | Age: 75
End: 2025-01-27
Payer: MEDICARE

## 2025-01-27 VITALS — HEIGHT: 65 IN | WEIGHT: 176 LBS | BODY MASS INDEX: 29.32 KG/M2

## 2025-01-27 DIAGNOSIS — M65.332 TRIGGER MIDDLE FINGER OF LEFT HAND: Primary | ICD-10-CM

## 2025-01-27 DIAGNOSIS — G56.02 CARPAL TUNNEL SYNDROME OF LEFT WRIST: ICD-10-CM

## 2025-01-27 PROCEDURE — 1160F RVW MEDS BY RX/DR IN RCRD: CPT | Performed by: STUDENT IN AN ORGANIZED HEALTH CARE EDUCATION/TRAINING PROGRAM

## 2025-01-27 PROCEDURE — 1159F MED LIST DOCD IN RCRD: CPT | Performed by: STUDENT IN AN ORGANIZED HEALTH CARE EDUCATION/TRAINING PROGRAM

## 2025-01-27 PROCEDURE — 99213 OFFICE O/P EST LOW 20 MIN: CPT | Performed by: STUDENT IN AN ORGANIZED HEALTH CARE EDUCATION/TRAINING PROGRAM

## 2025-01-27 RX ORDER — MONTELUKAST SODIUM 10 MG/1
10 TABLET ORAL NIGHTLY
COMMUNITY

## 2025-01-27 RX ORDER — NICOTINE POLACRILEX 4 MG/1
GUM, CHEWING ORAL
COMMUNITY
Start: 2020-05-30

## 2025-01-27 RX ORDER — METHYLPREDNISOLONE 4 MG/1
1 TABLET ORAL DAILY
Qty: 21 TABLET | Refills: 0 | Status: SHIPPED | OUTPATIENT
Start: 2025-01-27

## 2025-01-27 NOTE — PROGRESS NOTES
[facility]     Office Visit       Date: 01/27/2025   Patient Name: Thais Weeks  MRN: [id number]  YOB: 1950    Referring Physician: No ref. provider found     Chief Complaint:   Chief Complaint   Patient presents with    Follow-up     3 month follow up --Trigger middle finger of left hand      History of Present Illness:   Thais Weeks is a 74 y.o. female right-hand-dominant presenting to clinic for follow-up of left long trigger finger.  The patient has received a corticosteroid injection in the past and has had several months of improvements.  She reports that the pain and triggering has returned to her long finger.  She also endorses numbness and tingling in her hands that are worse in the thumb index and long fingers.  She wakens at night but she is unsure if it is due to pain or the numbness and tingling.  She does state that her hand will fall asleep while she is driving.  No other complaints or concerns.    Patient has had a history of increased blood glucose levels, however has not been diagnosed with diabetes.     Subjective   Review of Systems:   Review of Systems   Constitutional:  Negative for chills, fever, unexpected weight gain and unexpected weight loss.   HENT:  Negative for congestion, postnasal drip and rhinorrhea.    Eyes:  Negative for blurred vision.   Respiratory:  Negative for shortness of breath.    Cardiovascular:  Negative for leg swelling.   Gastrointestinal:  Negative for abdominal pain, nausea and vomiting.   Genitourinary:  Negative for difficulty urinating.   Musculoskeletal:  Positive for arthralgias. Negative for gait problem, joint swelling and myalgias.   Skin:  Negative for skin lesions and wound.   Neurological:  Negative for dizziness, weakness, light-headedness and numbness.   Hematological:  Does not bruise/bleed easily.   Psychiatric/Behavioral:  Negative for depressed  "mood.       Pertinent review of systems per HPI    I reviewed the patient's chief complaint, history of present illness, review of systems, past medical history, surgical history, family history, social history, medications and allergy list in the EMR on 01/27/2025 and agree with the findings above.    Objective    Vital Signs:   Vitals:    01/27/25 1451   Weight: 79.8 kg (176 lb)   Height: 165.1 cm (65\")     General: No acute distress. Alert and oriented.   Cardiovascular: Palpable radial pulse.   Respiratory: Breathing is nonlabored.   Ortho Exam:  Examination left upper extremity measures no deformity.  No skin lesions or abrasions.  No atrophy or wasting.  She is tender to the long finger A1 pulley and there is catching and locking during examination.  The range of the hand wrist and digits are nontender.  Positive Tinel, Durkan's, Phalen's at the wrist.  Negative Tinel's at the elbow.  Nontender over the pronator with negative Tinel's.  5/5 APB and FDI strength.  Station is grossly intact light touch of the median and ulnar distribution of the hand.  Warm and well-perfused distally.    Imaging / Studies:    Imaging Results (Last 24 Hours)       ** No results found for the last 24 hours. **          Assessment / Plan    Assessment/Plan:   Thais Weeks is a 74 y.o. female with left long trigger finger and left carpal tunnel syndrome.    Unfortunately, the patient has had recurrence of her left long trigger finger.  We discussed options moving forward including repeat injection versus surgical treatment.  I so discussed with the patient their clinical demonstrate carpal tunnel syndrome.  The pathophysiology of the condition, including compression of the median nerve in the carpal tunnel, was explained in detail.  It was also discussed that with more severe symptomatology, such as persistent and worsening paresthesias, that permanent nerve damage may result, which may make improvement after surgery less " predictable.  Both conservative and surgical options were discussed.  Conservative treatments in the form of: observation, gentle nerve gliding exercises, night time splinting, and injection were presented.  Operative treatment in the form of open carpal tunnel release was presented and reiterated that the goal of surgery is to prevent further compression and damage to the nerve. Further workup with electrodiagnostic studies was also discussed and recommended.  The patient was agreeable to obtaining electrodiagnostic studies.  Will hold off on any repeat injections into the trigger finger.  In the meantime she will also perform nerve gliding exercises as well as bracing at night.  They were agreeable with the plan.  All questions and concerns were addressed.        ICD-10-CM ICD-9-CM   1. Trigger middle finger of left hand  M65.332 727.03   2. Carpal tunnel syndrome of left wrist  G56.02 354.0     Follow Up:   Return for Follow Up- After Testing.      Sheridan Evangelista MD  [facility] [place] [facility]

## 2025-02-10 ENCOUNTER — OFFICE VISIT (OUTPATIENT)
Dept: ORTHOPEDIC SURGERY | Facility: CLINIC | Age: 75
End: 2025-02-10
Payer: MEDICARE

## 2025-02-10 VITALS — WEIGHT: 176 LBS | HEIGHT: 65 IN | BODY MASS INDEX: 29.32 KG/M2

## 2025-02-10 DIAGNOSIS — G56.02 CARPAL TUNNEL SYNDROME OF LEFT WRIST: Primary | ICD-10-CM

## 2025-02-10 DIAGNOSIS — M65.332 TRIGGER MIDDLE FINGER OF LEFT HAND: ICD-10-CM

## 2025-02-10 RX ORDER — LIDOCAINE HYDROCHLORIDE 10 MG/ML
0.5 INJECTION, SOLUTION INFILTRATION; PERINEURAL
Status: COMPLETED | OUTPATIENT
Start: 2025-02-10 | End: 2025-02-10

## 2025-02-10 RX ORDER — TRIAMCINOLONE ACETONIDE 40 MG/ML
20 INJECTION, SUSPENSION INTRA-ARTICULAR; INTRAMUSCULAR
Status: COMPLETED | OUTPATIENT
Start: 2025-02-10 | End: 2025-02-10

## 2025-02-10 RX ADMIN — TRIAMCINOLONE ACETONIDE 20 MG: 40 INJECTION, SUSPENSION INTRA-ARTICULAR; INTRAMUSCULAR at 10:47

## 2025-02-10 RX ADMIN — LIDOCAINE HYDROCHLORIDE 0.5 ML: 10 INJECTION, SOLUTION INFILTRATION; PERINEURAL at 10:47

## 2025-02-10 NOTE — PROGRESS NOTES
"                                                                 [facility]opedic     Office Visit       Date: 02/10/2025   Patient Name: Thais Weeks  MRN: [id number]  YOB: 1950    Referring Physician: No ref. provider found     Chief Complaint:   Chief Complaint   Patient presents with    Follow-up     2 week (EMG) follow-up: Trigger middle finger of left hand     History of Present Illness:   Thais Weeks is a 74 y.o. female right-hand-dominant presenting to clinic for follow-up of left long trigger finger and with EMG results.  She reports that the splint has been helpful regarding her numbness and tingling.  No change in the catching locking to the long finger.  No other complaints or concerns.    Subjective   Review of Systems:   Review of Systems   HENT: Negative.     Eyes: Negative.    Respiratory: Negative.     Cardiovascular: Negative.    Gastrointestinal: Negative.    Endocrine: Negative.    Genitourinary: Negative.    Musculoskeletal:  Positive for arthralgias.   Allergic/Immunologic: Negative.    Hematological: Negative.    Psychiatric/Behavioral: Negative.        Pertinent review of systems per HPI    I reviewed the patient's chief complaint, history of present illness, review of systems, past medical history, surgical history, family history, social history, medications and allergy list in the EMR on 02/10/2025 and agree with the findings above.    Objective    Vital Signs:   Vitals:    02/10/25 1021   Weight: 79.8 kg (176 lb)   Height: 165.1 cm (65\")     General: No acute distress. Alert and oriented.   Cardiovascular: Palpable radial pulse.   Respiratory: Breathing is nonlabored.   Ortho Exam:  Examination of the left upper extremity demonstrates no deformity. No skin lesions or abrasions. No atrophy or wasting. She is tender to the long finger A1 pulley and there is catching and locking during examination. The range of the hand wrist and digits are nontender. " Positive Tinel, Durkan's, Phalen's at the wrist. Negative Tinel's at the elbow. Nontender over the pronator with negative Tinel's. 5/5 APB and FDI strength. Station is grossly intact light touch of the median and ulnar distribution of the hand. Warm and well-perfused distally.     Imaging / Studies:    Imaging Results (Last 24 Hours)       ** No results found for the last 24 hours. **        EMG nerve conduction study performed on 2/3/2025 was personally reviewed interpreted by myself.  This demonstrates a moderate right carpal tunnel syndrome, mild left carpal tunnel syndrome, mild bilateral cubital tunnel syndrome.    Procedure Note:  After reviewing the risks, benefits and alternatives to a steroid injection, which include but are not limited to; hypopigmentation, fat necrosis/atrophy, pain, swelling, bleeding, bruising, damage to nearby nerves/vessels, allergic reaction , transient elevation in blood glucose levels and infection a verbal consent was obtained. A time-out was then performed and the affected hand was prepped with chlorhexadine soap and ethyl chloride was used to numb the skin. The left carpal tunnel and left long finger flexor tendon sheath was injected with 0.5cc: 0.5cc mixture of Kenalog - 40 mg/ml and Lidocaine - 1% / 2 ml. The injection was well tolerated and a sterile dressing was applied. There were no complications. I advised the patient that they might experience some local discomfort for the next couple days and can apply ice to the site as needed.    Assessment / Plan    Assessment/Plan:   Thais Weeks is a 74 y.o. female with left long trigger finger and bilateral carpal tunnel syndrome.    I reviewed with the patient her EMG results demonstrate mild left and moderate right carpal tunnel syndrome.  She appears asymptomatic to bilateral cubital tunnels.  Discussed options moving forward.  Most interested in a therapeutic and diagnostic corticosteroid injection into the left carpal  tunnel and left long finger flexor tendon sheath.  This provided tolerated well.  She will continue nighttime bracing and gentle nerve glides.  I will see her back in 3 months for evaluation.  If not improved, we may consider concomitant carpal tunnel release and trigger finger release.  All questions and concerns were addressed.  She is agreeable.      ICD-10-CM ICD-9-CM   1. Carpal tunnel syndrome of left wrist  G56.02 354.0   2. Trigger middle finger of left hand  M65.332 727.03     Follow Up:   Return in about 3 months (around 5/10/2025) for Follow Up.      Sheridan Evangelista MD  [facility]

## 2025-02-10 NOTE — PROGRESS NOTES
Procedure   - Hand/Upper Extremity Injection: L carpal tunnel for carpal tunnel syndrome on 2/10/2025 10:47 AM  Indications: pain  Details: 27 G needle, volar approach  Medications: 0.5 mL lidocaine 1 %; 20 mg triamcinolone acetonide 40 MG/ML  Outcome: tolerated well, no immediate complications  Procedure, treatment alternatives, risks and benefits explained, specific risks discussed. Consent was given by the patient. Immediately prior to procedure a time out was called to verify the correct patient, procedure, equipment, support staff and site/side marked as required. Patient was prepped and draped in the usual sterile fashion.       - Hand/Upper Extremity Injection: L long A1 for trigger finger on 2/10/2025 10:47 AM  Indications: pain  Details: 27 G needle, volar approach  Medications: 0.5 mL lidocaine 1 %; 20 mg triamcinolone acetonide 40 MG/ML  Outcome: tolerated well, no immediate complications  Procedure, treatment alternatives, risks and benefits explained, specific risks discussed. Consent was given by the patient. Immediately prior to procedure a time out was called to verify the correct patient, procedure, equipment, support staff and site/side marked as required. Patient was prepped and draped in the usual sterile fashion.

## 2025-02-26 ENCOUNTER — OFFICE VISIT (OUTPATIENT)
Dept: ORTHOPEDIC SURGERY | Facility: CLINIC | Age: 75
End: 2025-02-26
Payer: MEDICARE

## 2025-02-26 VITALS
BODY MASS INDEX: 29.39 KG/M2 | SYSTOLIC BLOOD PRESSURE: 130 MMHG | HEIGHT: 65 IN | DIASTOLIC BLOOD PRESSURE: 72 MMHG | WEIGHT: 176.4 LBS

## 2025-02-26 DIAGNOSIS — M17.12 PRIMARY OSTEOARTHRITIS OF LEFT KNEE: ICD-10-CM

## 2025-02-26 DIAGNOSIS — M25.562 LEFT KNEE PAIN, UNSPECIFIED CHRONICITY: Primary | ICD-10-CM

## 2025-02-26 RX ORDER — FLUTICASONE FUROATE, UMECLIDINIUM BROMIDE AND VILANTEROL TRIFENATATE 200; 62.5; 25 UG/1; UG/1; UG/1
POWDER RESPIRATORY (INHALATION)
COMMUNITY
Start: 2025-02-13

## 2025-02-26 NOTE — PROGRESS NOTES
[facility] Office Visit - Deb Evans PA-C    Office Visit       Patient Name: Thais Weeks    Chief Complaint:   Chief Complaint   Patient presents with    Left Knee - Pain, Initial Evaluation       Referring Physician: No ref. provider found    History of Present Illness:   Thais Weeks is a very pleasant 74 y.o. female who presents presents to discuss her left knee pain.  She has known left knee arthritis.  Is been bothering her since 2017 and has gradually progressed.  She reports pain in the medial aspect of the knee with weightbearing and walking that can be up to 7/10 on a pain scale.  She denies any swelling or warmth.  She reports that she feels very unsteady on her feet.  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  She is unsure whether the unsteadiness is coming from her knee pain, foot pain or problems due to her medications.  She is here today to get somewhat of baseline going forward.    Subjective     Review of Systems   Constitutional: Negative.  Negative for chills, fatigue and fever.   HENT: Negative.  Negative for congestion and dental problem.    Eyes: Negative.  Negative for blurred vision.   Respiratory: Negative.  Negative for shortness of breath.    Cardiovascular: Negative.  Negative for leg swelling.   Gastrointestinal: Negative.  Negative for abdominal pain.   Endocrine: Negative.  Negative for polyuria.   Genitourinary: Negative.  Negative for difficulty urinating.   Musculoskeletal:  Positive for arthralgias.   Skin: Negative.    Allergic/Immunologic: Negative.    Neurological: Negative.    Hematological: Negative.  Negative for adenopathy.   Psychiatric/Behavioral: Negative.  Negative for behavioral problems.         Past Medical History:   Past Medical History:   Diagnosis Date    Arthritis of knee     Bursitis     rotator cuff bursitis    CTS (carpal tunnel syndrome)      Dislocation of finger     Trigger  finger    Edema     Esophagitis     Heart murmur     Hypertension     Knee swelling     Years ago    KAREN (obstructive sleep apnea)     Osteoarthritis of ankle     Rotator cuff syndrome     Unsure    Tetanus        Past Surgical History:   Past Surgical History:   Procedure Laterality Date    CATARACT EXTRACTION      GALLBLADDER SURGERY      ROTATOR CUFF REPAIR Right     SHOULDER SURGERY      Rotator    TOTAL KNEE ARTHROPLASTY Right     TRIGGER POINT INJECTION      FOR FINGER AND CARPAL T.       Family History:   Family History   Problem Relation Age of Onset    Heart disease Father     Heart attack Maternal Grandfather     Depression Mother     Heart disease Mother     Hypertension Mother     Cancer Other     Depression Other     Stroke Other     Diabetes Other     Heart disease Other     Hypertension Other     Heart attack Other     Diabetes Other     Diabetes Maternal Grandmother         not living -- diabetes as adult    Diabetes Sister         older -- diabetic several years    Diabetes Maternal Uncle         not living -- type 2    Breast cancer Neg Hx     Ovarian cancer Neg Hx        Social History:   Social History     Socioeconomic History    Marital status: Single   Tobacco Use    Smoking status: Never     Passive exposure: Past    Smokeless tobacco: Never   Vaping Use    Vaping status: Never Used   Substance and Sexual Activity    Alcohol use: Yes     Alcohol/week: 1.0 - 2.0 standard drink of alcohol     Types: 1 - 2 Glasses of wine per week     Comment: Occasional not daily    Drug use: Never    Sexual activity: Not Currently     Birth control/protection: Abstinence       Medications:   Current Outpatient Medications:     Acetaminophen (TYLENOL ARTHRITIS PAIN PO), Take  by mouth 2 (Two) Times a Day., Disp: , Rfl:     amLODIPine (NORVASC) 10 MG tablet, Take 1 tablet by mouth Daily., Disp: 30 tablet, Rfl: 11    aspirin 81 MG EC tablet, Take 1 tablet by mouth Daily., Disp:  ", Rfl:     buPROPion XL (WELLBUTRIN XL) 300 MG 24 hr tablet, TAKE 1 TABLET EVERY DAY, Disp: , Rfl: 5    carvedilol (COREG) 25 MG tablet, TAKE 1 TABLET TWICE A DAY, Disp: , Rfl: 5    losartan (COZAAR) 100 MG tablet, TAKE 1 TABLET BY MOUTH DAILY, Disp: , Rfl: 0    Omeprazole 20 MG tablet delayed-release, , Disp: , Rfl:     Trelegy Ellipta 200-62.5-25 MCG/ACT inhaler, , Disp: , Rfl:     Allergies: No Known Allergies    I have reviewed and updated the following portions of the patient's history and review of systems: allergies, current medications, past family history, past medical history, past social history, past surgical history and problem list.    Objective      Vital Signs:   Vitals:    02/26/25 1050   BP: 130/72   Weight: 80 kg (176 lb 6.4 oz)   Height: 165.1 cm (65\")       Ortho Exam:  Left knee exam: Tender over the medial joint line.  Range of motion 0-1 20 with positive crepitus.  Ligament stable.  Neurovascular intact distally.  Mildly antalgic gait    Results Review:   XR Knee 4+ View Left  Left Knee Radiographs  Indication: left knee pain  Views: Standing AP's and skiers of both knees, with lateral and sunrise   views of the left knee    Comparison: 12/22/2021    Findings:    Bone-on-bone contact medial, lateral, and patellofemoral compartments,   with varus alignment, advanced arthritic changes.  No unusual bony   features.  No significant change compared to the previous imaging.         Assessment / Plan      Assessment:  Diagnoses and all orders for this visit:    1. Left knee pain, unspecified chronicity (Primary)  -     XR Knee 4+ View Left    2. Primary osteoarthritis of left knee    3. Arthritis of left foot        Quality Metrics:   BMI:   BMI is >= 25 and <30. (Overweight) The following options were offered after discussion;: Information on healthy weight added to patient's after visit summary.       Tobacco:   Thais SILVA Micky  reports that she has never smoked. She has been exposed to tobacco " smoke. She has never used smokeless tobacco.      Plan:  Left knee arthritis.  I reviewed today's x-rays clinical findings past and current treatment the patient.  She has bone-on-bone arthritis in the medial compartment of the knee.  We discussed treatment options including good shoe wear, ice, NSAIDs, low impact exercise, knee/hip strengthening, weight loss and the 4 fold multiplier on the joint, steroid injection, viscosupplementation and eventually knee replacement.  Regarding her unsteadiness, it could be coming from her knee if the knee is giving out on her.  She has severe talonavicular arthritis to and this could be contributing as well.  I offered prescription for physical therapy patient politely declined.  She does not want injection or any further treatment for the knee at this time.  She is going to discuss with her primary care doctor unsteadiness secondary to medication.  She will return to see us as needed.          Deb Evans PA-C  [facility]       Dictated using Dragon Speech Recognition.

## 2025-04-07 ENCOUNTER — OFFICE VISIT (OUTPATIENT)
Dept: ORTHOPEDIC SURGERY | Facility: CLINIC | Age: 75
End: 2025-04-07
Payer: MEDICARE

## 2025-04-07 VITALS
WEIGHT: 177.4 LBS | SYSTOLIC BLOOD PRESSURE: 130 MMHG | BODY MASS INDEX: 29.56 KG/M2 | DIASTOLIC BLOOD PRESSURE: 70 MMHG | HEIGHT: 65 IN

## 2025-04-07 DIAGNOSIS — M19.049 ARTHRITIS OF FINGER: Primary | ICD-10-CM

## 2025-04-07 DIAGNOSIS — M79.641 RIGHT HAND PAIN: ICD-10-CM

## 2025-04-07 PROCEDURE — 3078F DIAST BP <80 MM HG: CPT | Performed by: STUDENT IN AN ORGANIZED HEALTH CARE EDUCATION/TRAINING PROGRAM

## 2025-04-07 PROCEDURE — 3075F SYST BP GE 130 - 139MM HG: CPT | Performed by: STUDENT IN AN ORGANIZED HEALTH CARE EDUCATION/TRAINING PROGRAM

## 2025-04-07 PROCEDURE — 20600 DRAIN/INJ JOINT/BURSA W/O US: CPT | Performed by: STUDENT IN AN ORGANIZED HEALTH CARE EDUCATION/TRAINING PROGRAM

## 2025-04-07 PROCEDURE — 1160F RVW MEDS BY RX/DR IN RCRD: CPT | Performed by: STUDENT IN AN ORGANIZED HEALTH CARE EDUCATION/TRAINING PROGRAM

## 2025-04-07 PROCEDURE — 1159F MED LIST DOCD IN RCRD: CPT | Performed by: STUDENT IN AN ORGANIZED HEALTH CARE EDUCATION/TRAINING PROGRAM

## 2025-04-07 PROCEDURE — 99213 OFFICE O/P EST LOW 20 MIN: CPT | Performed by: STUDENT IN AN ORGANIZED HEALTH CARE EDUCATION/TRAINING PROGRAM

## 2025-04-07 RX ORDER — TRIAMCINOLONE ACETONIDE 40 MG/ML
20 INJECTION, SUSPENSION INTRA-ARTICULAR; INTRAMUSCULAR
Status: COMPLETED | OUTPATIENT
Start: 2025-04-07 | End: 2025-04-07

## 2025-04-07 RX ORDER — LIDOCAINE HYDROCHLORIDE 10 MG/ML
0.5 INJECTION, SOLUTION EPIDURAL; INFILTRATION; INTRACAUDAL; PERINEURAL
Status: COMPLETED | OUTPATIENT
Start: 2025-04-07 | End: 2025-04-07

## 2025-04-07 RX ADMIN — LIDOCAINE HYDROCHLORIDE 0.5 ML: 10 INJECTION, SOLUTION EPIDURAL; INFILTRATION; INTRACAUDAL; PERINEURAL at 08:04

## 2025-04-07 RX ADMIN — TRIAMCINOLONE ACETONIDE 20 MG: 40 INJECTION, SUSPENSION INTRA-ARTICULAR; INTRAMUSCULAR at 08:04

## 2025-04-07 NOTE — PROGRESS NOTES
"                                                                 [facility]     Office Visit       Date: 04/07/2025   Patient Name: Thais Weeks  MRN: [id number]  YOB: 1950    Referring Physician: No ref. provider found     Chief Complaint:   Chief Complaint   Patient presents with    Right Hand - Initial Evaluation     History of Present Illness:   Thais Weeks is a 74 y.o. female right-hand-dominant presented to clinic as an established patient for complaints of right hand pain.  I treated the patient in the past for a left long trigger finger and bilateral carpal tunnel syndrome.  She has previously received corticosteroid injections in the left carpal tunnel and left long trigger finger.  She now reports right long finger pain.  Symptoms have been present for the past 2 weeks.  She endorses 10/10 pain.  She is attempted Voltaren gel without significant improvements.  She reports mostly pain.  Denies numbness and tingling.  No other complaints or concerns.    Subjective   Review of Systems:   Review of Systems   Pertinent review of systems per HPI    I reviewed the patient's chief complaint, history of present illness, review of systems, past medical history, surgical history, family history, social history, medications and allergy list in the EMR on 04/07/2025 and agree with the findings above.    Objective    Vital Signs:   Vitals:    04/07/25 0743   BP: 130/70   Weight: 80.5 kg (177 lb 6.4 oz)   Height: 165.1 cm (65\")     General: No acute distress. Alert and oriented.   Cardiovascular: Palpable radial pulse.   Respiratory: Breathing is nonlabored.   Ortho Exam:  Examination of the right upper extremity demonstrates no deformity.  No skin lesions or abrasions.  No atrophy or wasting.  She is tender along the long finger A1 pulley.  She is tender along the third MCP joint line dorsally.  Weakly positive MCP joint grind.  Mild swelling noted dorsally over the MCP joint.  " She is able make a full composite fist.  There is no catching or locking during exam.  Negative Tinel, Durkan's, Phalen's of the wrist.  Warm well-perfused distally.    Imaging / Studies:    Imaging Results (Last 24 Hours)       Procedure Component Value Units Date/Time    XR Hand 3+ View Right [[id number]] Resulted: 04/07/25 0801     Updated: 04/07/25 0801    Narrative:      Right Hand X-Ray    Indication: Pain    Views:  PA, Lateral, and Oblique     Comparison:  None    Findings:  No fractures or dislocation. No bony lesions. Normal soft tissues.    Degenerative changes of the thumb CMC joint, index and long finger MCP   joints.             EMG nerve conduction study performed on 2/3/2025 was personally reviewed interpreted by myself. This demonstrates a moderate right carpal tunnel syndrome, mild left carpal tunnel syndrome, mild bilateral cubital tunnel syndrome.     Procedure Note:  After reviewing the risks, benefits and alternatives to a steroid injection, which include but are not limited to; hypopigmentation, fat necrosis/atrophy, pain, swelling, bleeding, bruising, damage to nearby nerves/vessels, allergic reaction , transient elevation in blood glucose levels and infection a verbal consent was obtained. A time-out was then performed and the affected hand was prepped with chlorhexadine soap and ethyl chloride was used to numb the skin. The right third MCP joint was injected with 0.5cc: 0.5cc mixture of Kenalog - 40 mg/ml and Lidocaine - 1% / 2 ml. The injection was well tolerated and a sterile dressing was applied. There were no complications. I advised the patient that they might experience some local discomfort for the next couple days and can apply ice to the site as needed.    Assessment / Plan    Assessment/Plan:   Thais Weeks is a 74 y.o. female with right long finger MCP joint arthritis.    I discussed with the patient their clinical and radiographic findings demonstrate third MCP joint  arthritis.  We had a lengthy discussion regarding the pathophysiology of their diagnosis.  We discussed the operative and nonoperative treatments.  Nonoperative treatments include oral and topical anti-inflammatories, injection, therapy.  Operative treatments were briefly mentioned but include arthroplasty versus fusion.  After expressing understanding of all options, the patient elects to proceed with steroid injection today.  Will also continue oral and topical anti-inflammatories.  I will see her back in 3 months for reevaluation.  They were agreeable with the plan.  All questions and concerns were addressed.       ICD-10-CM ICD-9-CM   1. Arthritis of finger  M19.049 716.94   2. Right hand pain  M79.641 729.5     Follow Up:   Return in about 3 months (around 7/7/2025) for Follow Up.      Sheridan Evangelista MD  [facility]

## 2025-04-07 NOTE — PROGRESS NOTES
Procedure   - Hand/Upper Extremity Injection: R long MCP for osteoarthritis on 4/7/2025 8:04 AM  Indications: pain  Details: 27 G needle, volar approach  Medications: 20 mg triamcinolone acetonide 40 MG/ML; 0.5 mL lidocaine PF 1% 1 %  Outcome: tolerated well, no immediate complications  Procedure, treatment alternatives, risks and benefits explained, specific risks discussed. Consent was given by the patient. Immediately prior to procedure a time out was called to verify the correct patient, procedure, equipment, support staff and site/side marked as required. Patient was prepped and draped in the usual sterile fashion.

## 2025-04-17 NOTE — PROGRESS NOTES
[facility]  [address], [address]  [place], [place], [address]    [phone]  WWW.[facility] Medical CenterSkelta SoftwareSoutheast Missouri Community Treatment Center           OUTPATIENT CLINIC CONSULTATION NOTE    Patient care team:  Patient Care Team:  Damon Hurtado MD as PCP - General (Family Medicine)  Jesus Hodges MD as Consulting Physician (Cardiology)  Dionne Fitzgerald APRN as Nurse Practitioner (Cardiology)    Requesting Provider and Reason for consultation: The patient is being seen today at the request of Damon Hurtado MD for abnormal EKG.     Subjective:   Chief complaint:   Chief Complaint   Patient presents with    Essential hypertension         Thais Weeks is a 75 y.o. female.  Cardiac focused problem list:  Abnormal EKG   Father had an unknown heart disease, mother had heart failure later in life  Chronic inferolateral ST depression noted remotely  Normal cardiac cath by Dr. Rasmussen in 2004.   Stress test, 5/10/2017:  Small sized, mildly severe area of ischemia located in the basal inferior lateral wall.  Low risk study.   Echocardiogram, 5/10/2017:  LVEF >70%. Mild LVH. Grade I diastolic dysfunction. Moderately dilated LA.  RVSP 35-45 mmHg.   RBBB noted at PCP office in 2025  Hypertension   COPD  GERD  Arthritis     HPI:    Patient presents today in consultation for abnormal EKG.  Previously seen in cardiology clinic in 2017 for an abnormal EKG    Over the last few months the patient has noted increased exertional dyspnea, fatigue.  Interferes with her day-to-day activities.  Has a discomfort in her chest about once a week.    Recently started on Trelegy which has not only helped her symptoms mildly      Review of Systems:  As noted above in the HPI    PFSH:  Patient Active Problem List   Diagnosis    Essential hypertension    Gastroesophageal reflux disease without esophagitis    Heart murmur    Trigger middle finger of left hand    Carpal tunnel syndrome of left wrist    Arthritis of finger         Current  "Outpatient Medications:     Acetaminophen (TYLENOL ARTHRITIS PAIN PO), Take  by mouth 2 (Two) Times a Day., Disp: , Rfl:     amLODIPine (NORVASC) 10 MG tablet, Take 1 tablet by mouth Daily., Disp: 30 tablet, Rfl: 11    aspirin 81 MG EC tablet, Take 1 tablet by mouth Daily., Disp: , Rfl:     buPROPion XL (WELLBUTRIN XL) 300 MG 24 hr tablet, TAKE 1 TABLET EVERY DAY, Disp: , Rfl: 5    carvedilol (COREG) 25 MG tablet, TAKE 1 TABLET TWICE A DAY, Disp: , Rfl: 5    losartan (COZAAR) 100 MG tablet, TAKE 1 TABLET BY MOUTH DAILY, Disp: , Rfl: 0    Omeprazole 20 MG tablet delayed-release, , Disp: , Rfl:     Trelegy Ellipta 200-62.5-25 MCG/ACT inhaler, , Disp: , Rfl:     No Known Allergies    Social History     Socioeconomic History    Marital status: Single   Tobacco Use    Smoking status: Never     Passive exposure: Past    Smokeless tobacco: Never   Vaping Use    Vaping status: Never Used   Substance and Sexual Activity    Alcohol use: Yes     Alcohol/week: 1.0 - 2.0 standard drink of alcohol     Types: 1 - 2 Glasses of wine per week     Comment: Occasional not daily    Drug use: Never    Sexual activity: Not Currently     Birth control/protection: Abstinence     Family History   Problem Relation Age of Onset    Heart disease Father     Heart attack Maternal Grandfather     Depression Mother     Heart disease Mother     Hypertension Mother     Cancer Other     Depression Other     Stroke Other     Diabetes Other     Heart disease Other     Hypertension Other     Heart attack Other     Diabetes Other     Diabetes Maternal Grandmother         not living -- diabetes as adult    Diabetes Sister         older -- diabetic several years    Diabetes Maternal Uncle         not living -- type 2    Breast cancer Neg Hx     Ovarian cancer Neg Hx          Objective:   Physical Exam:  /70 (BP Location: Right arm, Patient Position: Sitting, Cuff Size: Adult)   Pulse 67   Ht 165.1 cm (65\")   Wt 80.9 kg (178 lb 6.4 oz)   LMP  (LMP " "Unknown)   SpO2 98%   BMI 29.69 kg/m²   CONSTITUTIONAL: No acute distress  CARDIOVASCULAR: Regular rate and rhythm. Without murmur.  PERIPHERAL VASCULAR: No carotid bruit bilaterally.  Normal radial pulse.     Labs:  Labs reviewed by myself    No results found for: \"CHOL\"  No results found for: \"TRIG\"  No results found for: \"HDL\"  No results found for: \"LDL\"  No components found for: \"LDLDIRECTC\"    Diagnostic Data:      ECG 12 Lead    Date/Time: 4/21/2025 9:58 AM  Performed by: Jesus Hodges MD    Authorized by: Jesus Hodges MD  Comparison: compared with previous ECG from 4/10/2017  Comparison to previous ECG: RBBB now present.  Inferolateral ST abnormalities unchanged  Rhythm: sinus rhythm          Results for orders placed during the hospital encounter of 05/09/17    Adult Transthoracic Echo Complete With Contrast    Interpretation Summary  · Left ventricular function is normal. Estimated EF = 70%.  · Left ventricular wall thickness is consistent with mild concentric hypertrophy.  · Left ventricular diastolic dysfunction (grade I) consistent with impaired relaxation.  · Left atrial cavity size is moderately dilated.  · Estimated right ventricular systolic pressure from tricuspid regurgitation is mildly elevated (35-45 mmHg).      Assessment and Plan:     Dyspnea on exertion  Angina pectoris  Nonspecific ST abnormality, chronic  RBBB  Essential hypertension   Hypertensive heart disease  Dilated left atrium  Grade 1 DD  Mild concentric hypertrophy  COPD  Anemia  - Unclear etiology of symptoms, overall symptom pattern is concerning for cardiopulmonary problems.  Trelegy has only helped partially.  Some of her symptoms may be related to her anemia  - Recommend echo and stress test  - If cardiac testing unremarkable, will recommend PFTs and pulmonary referral  - Continue current antihypertensives  - Requesting last lipid panel from PCP office    - Return in about 6 months (around 10/21/2025) for Next follow up " with SANDRA Rodríguez

## 2025-04-21 ENCOUNTER — PATIENT ROUNDING (BHMG ONLY) (OUTPATIENT)
Dept: CARDIOLOGY | Facility: CLINIC | Age: 75
End: 2025-04-21
Payer: MEDICARE

## 2025-04-21 ENCOUNTER — OFFICE VISIT (OUTPATIENT)
Dept: CARDIOLOGY | Facility: CLINIC | Age: 75
End: 2025-04-21
Payer: MEDICARE

## 2025-04-21 VITALS
DIASTOLIC BLOOD PRESSURE: 70 MMHG | BODY MASS INDEX: 29.72 KG/M2 | HEIGHT: 65 IN | HEART RATE: 67 BPM | WEIGHT: 178.4 LBS | SYSTOLIC BLOOD PRESSURE: 152 MMHG | OXYGEN SATURATION: 98 %

## 2025-04-21 DIAGNOSIS — I10 ESSENTIAL HYPERTENSION: ICD-10-CM

## 2025-04-21 DIAGNOSIS — I20.9 ANGINA PECTORIS: ICD-10-CM

## 2025-04-21 DIAGNOSIS — R06.09 DYSPNEA ON EXERTION: Primary | ICD-10-CM

## 2025-04-21 PROCEDURE — 3078F DIAST BP <80 MM HG: CPT | Performed by: INTERNAL MEDICINE

## 2025-04-21 PROCEDURE — 99204 OFFICE O/P NEW MOD 45 MIN: CPT | Performed by: INTERNAL MEDICINE

## 2025-04-21 PROCEDURE — 3077F SYST BP >= 140 MM HG: CPT | Performed by: INTERNAL MEDICINE

## 2025-04-21 PROCEDURE — 93000 ELECTROCARDIOGRAM COMPLETE: CPT | Performed by: INTERNAL MEDICINE

## 2025-04-21 NOTE — PROGRESS NOTES
April 21, 2025    Hello, may I speak with Thais Weeks?    My name is Renee MCMILLAN.      I am  with [facility]ARDIOLOG[facility]  [address]  [place] [facility] [address] [phone].    Before we get started may I verify your date of birth? 1950    I am calling to officially welcome you to our practice and ask about your recent visit. Is this a good time to talk? yes    Tell me about your visit with us. What things went well?  No complaints; easy check in & visit.       We're always looking for ways to make our patients' experiences even better. Do you have recommendations on ways we may improve?  no    Overall were you satisfied with your first visit to our practice? yes       I appreciate you taking the time to speak with me today. Is there anything else I can do for you? no      Thank you, and have a great day.

## 2025-05-08 ENCOUNTER — HOSPITAL ENCOUNTER (OUTPATIENT)
Dept: CARDIOLOGY | Facility: HOSPITAL | Age: 75
Discharge: HOME | End: 2025-05-08
Payer: MEDICARE

## 2025-05-08 VITALS — BODY MASS INDEX: 29.72 KG/M2 | WEIGHT: 178.35 LBS | HEIGHT: 65 IN

## 2025-05-08 DIAGNOSIS — I10 ESSENTIAL HYPERTENSION: ICD-10-CM

## 2025-05-08 DIAGNOSIS — R06.09 DYSPNEA ON EXERTION: ICD-10-CM

## 2025-05-08 LAB
BH CV REST NUCLEAR ISOTOPE DOSE: 9.5 MCI
BH CV STRESS BP STAGE 2: NORMAL
BH CV STRESS BP STAGE 3: NORMAL
BH CV STRESS COMMENTS STAGE 1: NORMAL
BH CV STRESS DOSE REGADENOSON STAGE 1: 0.4
BH CV STRESS DURATION MIN STAGE 1: 1
BH CV STRESS DURATION MIN STAGE 2: 1
BH CV STRESS DURATION MIN STAGE 3: 1
BH CV STRESS DURATION MIN STAGE 4: 1
BH CV STRESS DURATION SEC STAGE 1: 0
BH CV STRESS DURATION SEC STAGE 2: 0
BH CV STRESS DURATION SEC STAGE 3: 0
BH CV STRESS DURATION SEC STAGE 4: 0
BH CV STRESS HR STAGE 1: 91
BH CV STRESS HR STAGE 2: 96
BH CV STRESS HR STAGE 3: 90
BH CV STRESS HR STAGE 4: 88
BH CV STRESS NUCLEAR ISOTOPE DOSE: 33 MCI
BH CV STRESS O2 STAGE 1: 98
BH CV STRESS O2 STAGE 2: 99
BH CV STRESS O2 STAGE 3: 99
BH CV STRESS O2 STAGE 4: 98
BH CV STRESS PROTOCOL 1: NORMAL
BH CV STRESS RECOVERY BP: NORMAL MMHG
BH CV STRESS RECOVERY HR: 87 BPM
BH CV STRESS RECOVERY O2: 97 %
BH CV STRESS STAGE 1: 1
BH CV STRESS STAGE 2: 2
BH CV STRESS STAGE 3: 3
BH CV STRESS STAGE 4: 4
MAXIMAL PREDICTED HEART RATE: 145 BPM
PERCENT MAX PREDICTED HR: 67.59 %
SPECT HRT GATED+EF W RNC IV: 82 %
STRESS BASELINE BP: NORMAL MMHG
STRESS BASELINE HR: 82 BPM
STRESS O2 SAT REST: 98 %
STRESS PERCENT HR: 80 %
STRESS POST ESTIMATED WORKLOAD: 1 METS
STRESS POST EXERCISE DUR MIN: 4 MIN
STRESS POST EXERCISE DUR SEC: 0 SEC
STRESS POST O2 SAT PEAK: 99 %
STRESS POST PEAK BP: NORMAL MMHG
STRESS POST PEAK HR: 98 BPM
STRESS TARGET HR: 123 BPM

## 2025-05-08 PROCEDURE — 78452 HT MUSCLE IMAGE SPECT MULT: CPT

## 2025-05-08 PROCEDURE — 25010000002 REGADENOSON 0.4 MG/5ML SOLUTION: Performed by: INTERNAL MEDICINE

## 2025-05-08 PROCEDURE — 34310000005 TECHNETIUM SESTAMIBI: Performed by: INTERNAL MEDICINE

## 2025-05-08 PROCEDURE — 93017 CV STRESS TEST TRACING ONLY: CPT

## 2025-05-08 PROCEDURE — A9500 TC99M SESTAMIBI: HCPCS | Performed by: INTERNAL MEDICINE

## 2025-05-08 RX ORDER — REGADENOSON 0.08 MG/ML
0.4 INJECTION, SOLUTION INTRAVENOUS ONCE
Status: COMPLETED | OUTPATIENT
Start: 2025-05-08 | End: 2025-05-08

## 2025-05-08 RX ORDER — CAFFEINE CITRATE 20 MG/ML
60 SOLUTION INTRAVENOUS ONCE
Status: COMPLETED | OUTPATIENT
Start: 2025-05-08 | End: 2025-05-08

## 2025-05-08 RX ADMIN — REGADENOSON 0.4 MG: 0.08 INJECTION, SOLUTION INTRAVENOUS at 09:28

## 2025-05-08 RX ADMIN — TECHNETIUM TC 99M SESTAMIBI 1 DOSE: 1 INJECTION INTRAVENOUS at 11:00

## 2025-05-08 RX ADMIN — CAFFEINE CITRATE 60 MG: 20 INJECTION, SOLUTION INTRAVENOUS at 09:37

## 2025-05-08 RX ADMIN — TECHNETIUM TC 99M SESTAMIBI 1 DOSE: 1 INJECTION INTRAVENOUS at 07:30

## 2025-05-09 ENCOUNTER — RESULTS FOLLOW-UP (OUTPATIENT)
Dept: CARDIOLOGY | Facility: CLINIC | Age: 75
End: 2025-05-09
Payer: MEDICARE

## 2025-05-09 NOTE — TELEPHONE ENCOUNTER
----- Message from Jesus Hodges sent at 5/8/2025  5:47 PM EDT -----  Stress test looked okay.  Will follow-up again after echo.  If echo looks okay and with persistent symptoms, will recommend PFTs and pulmonary referral  ----- Message -----  From: Jesus Hodges MD  Sent: 5/8/2025   5:42 PM EDT  To: Jesus Hodges MD

## 2025-06-06 ENCOUNTER — TRANSCRIBE ORDERS (OUTPATIENT)
Dept: ADMINISTRATIVE | Facility: HOSPITAL | Age: 75
End: 2025-06-06
Payer: MEDICARE

## 2025-06-06 DIAGNOSIS — Z12.31 SCREENING MAMMOGRAM FOR BREAST CANCER: Primary | ICD-10-CM

## 2025-06-20 ENCOUNTER — HOSPITAL ENCOUNTER (OUTPATIENT)
Dept: CARDIOLOGY | Facility: HOSPITAL | Age: 75
Discharge: HOME | End: 2025-06-20
Payer: MEDICARE

## 2025-06-20 VITALS
HEIGHT: 65 IN | DIASTOLIC BLOOD PRESSURE: 72 MMHG | WEIGHT: 176.37 LBS | BODY MASS INDEX: 29.38 KG/M2 | SYSTOLIC BLOOD PRESSURE: 119 MMHG

## 2025-06-20 DIAGNOSIS — R06.09 DYSPNEA ON EXERTION: ICD-10-CM

## 2025-06-20 DIAGNOSIS — I10 ESSENTIAL HYPERTENSION: ICD-10-CM

## 2025-06-20 LAB
AORTIC DIMENSIONLESS INDEX: 0.87 (DI)
ASCENDING AORTA: 2.8 CM
AV MEAN PRESS GRAD SYS DOP V1V2: 4.5 MMHG
AV VMAX SYS DOP: 146 CM/SEC
BH CV ECHO MEAS - AO MAX PG: 8.5 MMHG
BH CV ECHO MEAS - AO ROOT DIAM: 2.2 CM
BH CV ECHO MEAS - AO V2 VTI: 32.7 CM
BH CV ECHO MEAS - AVA(I,D): 2.7 CM2
BH CV ECHO MEAS - EDV(CUBED): 85.2 ML
BH CV ECHO MEAS - EDV(MOD-SP2): 44.7 ML
BH CV ECHO MEAS - EDV(MOD-SP4): 53.8 ML
BH CV ECHO MEAS - EF(MOD-SP2): 61.5 %
BH CV ECHO MEAS - EF(MOD-SP4): 64.5 %
BH CV ECHO MEAS - ESV(CUBED): 22 ML
BH CV ECHO MEAS - ESV(MOD-SP2): 17.2 ML
BH CV ECHO MEAS - ESV(MOD-SP4): 19.1 ML
BH CV ECHO MEAS - FS: 36.4 %
BH CV ECHO MEAS - IVS/LVPW: 1.08 CM
BH CV ECHO MEAS - IVSD: 1.3 CM
BH CV ECHO MEAS - LA DIMENSION: 4.2 CM
BH CV ECHO MEAS - LAT PEAK E' VEL: 7.7 CM/SEC
BH CV ECHO MEAS - LV DIASTOLIC VOL/BSA (35-75): 28.6 CM2
BH CV ECHO MEAS - LV MASS(C)D: 203 GRAMS
BH CV ECHO MEAS - LV MAX PG: 6 MMHG
BH CV ECHO MEAS - LV MEAN PG: 3 MMHG
BH CV ECHO MEAS - LV SYSTOLIC VOL/BSA (12-30): 10.1 CM2
BH CV ECHO MEAS - LV V1 MAX: 122.5 CM/SEC
BH CV ECHO MEAS - LV V1 VTI: 28.4 CM
BH CV ECHO MEAS - LVIDD: 4.4 CM
BH CV ECHO MEAS - LVIDS: 2.8 CM
BH CV ECHO MEAS - LVOT AREA: 3.1 CM2
BH CV ECHO MEAS - LVOT DIAM: 2 CM
BH CV ECHO MEAS - LVPWD: 1.2 CM
BH CV ECHO MEAS - MED PEAK E' VEL: 6.9 CM/SEC
BH CV ECHO MEAS - MV A MAX VEL: 68.7 CM/SEC
BH CV ECHO MEAS - MV DEC SLOPE: 535 CM/SEC2
BH CV ECHO MEAS - MV DEC TIME: 0.16 SEC
BH CV ECHO MEAS - MV E MAX VEL: 87 CM/SEC
BH CV ECHO MEAS - MV E/A: 1.27
BH CV ECHO MEAS - MV MAX PG: 8 MMHG
BH CV ECHO MEAS - MV MEAN PG: 2 MMHG
BH CV ECHO MEAS - MV P1/2T: 67.9 MSEC
BH CV ECHO MEAS - MV V2 VTI: 43.7 CM
BH CV ECHO MEAS - MVA(P1/2T): 3.2 CM2
BH CV ECHO MEAS - MVA(VTI): 2.04 CM2
BH CV ECHO MEAS - PA ACC TIME: 0.12 SEC
BH CV ECHO MEAS - RAP SYSTOLE: 3 MMHG
BH CV ECHO MEAS - RVSP: 23 MMHG
BH CV ECHO MEAS - SV(LVOT): 89.1 ML
BH CV ECHO MEAS - SV(MOD-SP2): 27.5 ML
BH CV ECHO MEAS - SV(MOD-SP4): 34.7 ML
BH CV ECHO MEAS - SVI(LVOT): 47.3 ML/M2
BH CV ECHO MEAS - SVI(MOD-SP2): 14.6 ML/M2
BH CV ECHO MEAS - SVI(MOD-SP4): 18.4 ML/M2
BH CV ECHO MEAS - TAPSE (>1.6): 2.01 CM
BH CV ECHO MEAS - TR MAX PG: 20 MMHG
BH CV ECHO MEASUREMENTS AVERAGE E/E' RATIO: 11.92
BH CV VAS BP RIGHT ARM: NORMAL MMHG
BH CV XLRA - RV BASE: 2.7 CM
BH CV XLRA - RV LENGTH: 4.7 CM
BH CV XLRA - RV MID: 2.7 CM
BH CV XLRA - TDI S': 14 CM/SEC
IVRT: 120 MS
LV EF BIPLANE MOD: 62.5 %

## 2025-06-20 PROCEDURE — 93306 TTE W/DOPPLER COMPLETE: CPT

## 2025-06-30 ENCOUNTER — HOSPITAL ENCOUNTER (OUTPATIENT)
Dept: PULMONOLOGY | Facility: HOSPITAL | Age: 75
Discharge: HOME | End: 2025-06-30
Payer: MEDICARE

## 2025-06-30 ENCOUNTER — LAB (OUTPATIENT)
Dept: LAB | Facility: HOSPITAL | Age: 75
End: 2025-06-30
Payer: MEDICARE

## 2025-06-30 DIAGNOSIS — R06.09 DYSPNEA ON EXERTION: ICD-10-CM

## 2025-06-30 DIAGNOSIS — I10 ESSENTIAL HYPERTENSION: ICD-10-CM

## 2025-06-30 LAB
ANION GAP SERPL CALCULATED.3IONS-SCNC: 11.4 MMOL/L (ref 5–15)
BUN SERPL-MCNC: 24 MG/DL (ref 8–23)
BUN/CREAT SERPL: 24.5 (ref 7–25)
CALCIUM SPEC-SCNC: 9.2 MG/DL (ref 8.6–10.5)
CHLORIDE SERPL-SCNC: 110 MMOL/L (ref 98–107)
CO2 SERPL-SCNC: 23.6 MMOL/L (ref 22–29)
CREAT SERPL-MCNC: 0.98 MG/DL (ref 0.57–1)
EGFRCR SERPLBLD CKD-EPI 2021: 60.3 ML/MIN/1.73
GLUCOSE SERPL-MCNC: 120 MG/DL (ref 65–99)
NT-PROBNP SERPL-MCNC: 122 PG/ML (ref 0–1800)
POTASSIUM SERPL-SCNC: 3.6 MMOL/L (ref 3.5–5.2)
SODIUM SERPL-SCNC: 145 MMOL/L (ref 136–145)

## 2025-06-30 PROCEDURE — 94060 EVALUATION OF WHEEZING: CPT

## 2025-06-30 PROCEDURE — 94726 PLETHYSMOGRAPHY LUNG VOLUMES: CPT

## 2025-06-30 PROCEDURE — 94729 DIFFUSING CAPACITY: CPT

## 2025-06-30 PROCEDURE — 83880 ASSAY OF NATRIURETIC PEPTIDE: CPT

## 2025-06-30 PROCEDURE — 80048 BASIC METABOLIC PNL TOTAL CA: CPT

## 2025-06-30 PROCEDURE — 36415 COLL VENOUS BLD VENIPUNCTURE: CPT

## 2025-06-30 PROCEDURE — 94640 AIRWAY INHALATION TREATMENT: CPT

## 2025-06-30 RX ORDER — ALBUTEROL SULFATE 0.83 MG/ML
2.5 SOLUTION RESPIRATORY (INHALATION) ONCE
Status: COMPLETED | OUTPATIENT
Start: 2025-06-30 | End: 2025-06-30

## 2025-06-30 RX ADMIN — ALBUTEROL SULFATE 2.5 MG: 2.5 SOLUTION RESPIRATORY (INHALATION) at 09:46

## 2025-07-01 ENCOUNTER — RESULTS FOLLOW-UP (OUTPATIENT)
Dept: CARDIOLOGY | Facility: CLINIC | Age: 75
End: 2025-07-01
Payer: MEDICARE

## 2025-07-01 NOTE — TELEPHONE ENCOUNTER
----- Message from Jesus Hodges sent at 7/1/2025 11:42 AM EDT -----  Labs look good.  Continue current plan.  Please ask how BP and heart rate has been on Entresto  ----- Message -----  From: Lab, Background User  Sent: 6/30/2025   2:33 PM EDT  To: Jesus Hodges MD

## 2025-07-02 ENCOUNTER — TELEPHONE (OUTPATIENT)
Dept: ORTHOPEDIC SURGERY | Facility: CLINIC | Age: 75
End: 2025-07-02

## 2025-07-02 NOTE — TELEPHONE ENCOUNTER
Caller: Thais Weeks    Relationship to patient: Self    Best call back number: [phone]    Patient is needing: PATIENT WANTS TO GET A HANDICAP PLACARD IN RELATION TO HER RIGHT KNEE PAIN SHE WAS SEEN FOR ON 2/26/25     PLEASE ADVISE PATIENT ON PROCESS OR ANSWER

## 2025-08-22 LAB
NCCN CRITERIA FLAG: NORMAL
TYRER CUZICK SCORE: 3.6